# Patient Record
Sex: FEMALE | Race: OTHER | NOT HISPANIC OR LATINO | URBAN - METROPOLITAN AREA
[De-identification: names, ages, dates, MRNs, and addresses within clinical notes are randomized per-mention and may not be internally consistent; named-entity substitution may affect disease eponyms.]

---

## 2022-05-06 ENCOUNTER — EMERGENCY (EMERGENCY)
Facility: HOSPITAL | Age: 42
LOS: 0 days | Discharge: HOME | End: 2022-05-06
Attending: EMERGENCY MEDICINE | Admitting: EMERGENCY MEDICINE
Payer: MEDICAID

## 2022-05-06 VITALS
HEART RATE: 92 BPM | WEIGHT: 192.02 LBS | TEMPERATURE: 98 F | DIASTOLIC BLOOD PRESSURE: 85 MMHG | RESPIRATION RATE: 18 BRPM | SYSTOLIC BLOOD PRESSURE: 134 MMHG | OXYGEN SATURATION: 97 %

## 2022-05-06 DIAGNOSIS — I10 ESSENTIAL (PRIMARY) HYPERTENSION: ICD-10-CM

## 2022-05-06 DIAGNOSIS — E11.9 TYPE 2 DIABETES MELLITUS WITHOUT COMPLICATIONS: ICD-10-CM

## 2022-05-06 DIAGNOSIS — R42 DIZZINESS AND GIDDINESS: ICD-10-CM

## 2022-05-06 DIAGNOSIS — H93.19 TINNITUS, UNSPECIFIED EAR: ICD-10-CM

## 2022-05-06 PROCEDURE — 99284 EMERGENCY DEPT VISIT MOD MDM: CPT

## 2022-05-06 RX ORDER — MECLIZINE HCL 12.5 MG
1 TABLET ORAL
Qty: 30 | Refills: 0
Start: 2022-05-06 | End: 2022-05-15

## 2022-05-06 RX ORDER — MECLIZINE HCL 12.5 MG
50 TABLET ORAL ONCE
Refills: 0 | Status: COMPLETED | OUTPATIENT
Start: 2022-05-06 | End: 2022-05-06

## 2022-05-06 RX ADMIN — Medication 50 MILLIGRAM(S): at 09:44

## 2022-05-06 NOTE — ED ADULT NURSE NOTE - OBJECTIVE STATEMENT
41 year old female complaining of vertigo x2 days. Pt states she feels dizzy and unbalanced. Pt denies pain, nausea, vomiting, fever, chest pain, sob.

## 2022-05-06 NOTE — ED PROVIDER NOTE - PATIENT PORTAL LINK FT
You can access the FollowMyHealth Patient Portal offered by [facility] by registering at the following website: [email or web address]. By joining [facility]’s FollowMyHealth portal, you will also be able to view your health information using other applications (apps) compatible with our system.

## 2022-05-06 NOTE — ED ADULT NURSE NOTE - NSIMPLEMENTINTERV_GEN_ALL_ED
Implemented All Fall Risk Interventions:  [place] to call system. Call bell, personal items and telephone within reach. Instruct patient to call for assistance. Room bathroom lighting operational. Non-slip footwear when patient is off stretcher. Physically safe environment: no spills, clutter or unnecessary equipment. Stretcher in lowest position, wheels locked, appropriate side rails in place. Provide visual cue, wrist band, yellow gown, etc. Monitor gait and stability. Monitor for mental status changes and reorient to person, place, and time. Review medications for side effects contributing to fall risk. Reinforce activity limits and safety measures with patient and family.

## 2022-05-06 NOTE — ED PROVIDER NOTE - NSFOLLOWUPCLINICS_GEN_ALL_ED_FT
[facility]  ENT  [address], [address]nd [place] [address]  Phone: [phone]  Fax:   Follow Up Time: 1-3 Days

## 2022-05-06 NOTE — ED ADULT TRIAGE NOTE - CHIEF COMPLAINT QUOTE
pt c/o unsteady gait and dizziness x2 days. pt went to [facility] yesterday got CT head which was negative. no new symptoms in past 24 hours.  in triage. pt c/o unsteady gait and dizziness x2 days. pt went to [facility] yesterday got CT head which was negative. no new symptoms in past 24 hours. NIH 0.  in triage.

## 2022-05-06 NOTE — ED PROVIDER NOTE - PHYSICAL EXAMINATION
CONST: Well appearing in NAD  EYES: PERRL, EOMI, Sclera and conjunctiva clear.   ENT: No nasal discharge. TM's clear B/L without drainage. No mastoid tenderness Oropharynx normal appearing, no erythema or exudates. Uvula midline.  NECK: Non-tender, no meningeal signs  CARD: Normal S1 S2; Normal rate and rhythm  RESP: Equal BS B/L, No wheezes, rhonchi or rales. No distress  GI: Soft, non-tender, non-distended.  MS: Normal ROM in all extremities. No midline spinal tenderness.  SKIN: Warm, dry, no acute rashes. Good turgor  NEURO: A&Ox3, No focal deficits. Strength 5/5 with no sensory deficits. Steady gait Nystagmus to right VF noted.

## 2022-05-06 NOTE — ED ADULT NURSE NOTE - CHIEF COMPLAINT QUOTE
pt c/o unsteady gait and dizziness x2 days. pt went to [facility] yesterday got CT head which was negative. no new symptoms in past 24 hours. NIH 0.  in triage.

## 2022-05-06 NOTE — ED PROVIDER NOTE - NS ED ATTENDING STATEMENT MOD
This was a shared visit with the LUKASZ. I reviewed and verified the documentation and independently performed the documented:

## 2022-05-06 NOTE — ED PROVIDER NOTE - NS ED ROS FT
CONST: No fever, chills or bodyaches  EYES: No pain, redness, drainage or visual changes.  ENT: (+) ear pain  (+)tinnitus  CARD: No chest pain, palpitations  RESP: No SOB, cough, hemoptysis. No hx of asthma or COPD  GI: No abdominal pain, N/V/D  MS: No joint pain, back pain or extremity pain/injury  SKIN: No rashes  NEURO: No headache, (+)dizziness, no paresthesias or LOC

## 2022-05-06 NOTE — ED PROVIDER NOTE - CLINICAL SUMMARY MEDICAL DECISION MAKING FREE TEXT BOX
42yo F history of HTN DM vertigo presenting with dizziness/room spinning sensaiton x2d. Went to [facility] yesterday, labs CTH done, neg. Presenting as she is not having relief with her sx. States she did not take any medicaations for her sx. Also c/o ear pressure/ringing. No headache, vision changes. Well appearing, NAD, non toxic. NCAT PERRLA EOMI neck supple non tender normal wob cta bl rrr abdomen s nt nd no rebound no guarding WWPx4 neuro non focal b/l TMs clear. pt feeling improved sp meclizine. ambulatory, no ataxia. Comfortable with discharge and follow-up outpatient, strict return precautions given. Endorses understanding of all of this and aware that they can return at any time for new or concerning symptoms. No further questions or concerns at this time

## 2022-05-06 NOTE — ED PROVIDER NOTE - OBJECTIVE STATEMENT
Pt with hx of DM, HTN presents with 2 days of vertigo, room spinning and off balanced gait. Worse with head movement. Was at [facility] yesterday for same and had labs and head CT that as per pt were all normal. Was dc home without meds and was not given an explanation for cause of her symptoms. Pt admits to seasonal allergies this month and for the past week has bilateral ear discomfort and ringing in the right ear. Denies HA, NV, weakness, numbness.

## 2022-05-07 ENCOUNTER — TRANSCRIPTION ENCOUNTER (OUTPATIENT)
Age: 42
End: 2022-05-07

## 2022-07-16 ENCOUNTER — APPOINTMENT (OUTPATIENT)
Age: 42
End: 2022-07-16

## 2022-07-16 PROBLEM — Z00.00 ENCOUNTER FOR PREVENTIVE HEALTH EXAMINATION: Status: ACTIVE | Noted: 2022-07-16

## 2022-12-14 ENCOUNTER — INPATIENT (INPATIENT)
Facility: HOSPITAL | Age: 42
LOS: 2 days | Discharge: HOME | End: 2022-12-17
Attending: HOSPITALIST | Admitting: HOSPITALIST
Payer: MEDICAID

## 2022-12-14 VITALS
TEMPERATURE: 98 F | DIASTOLIC BLOOD PRESSURE: 85 MMHG | SYSTOLIC BLOOD PRESSURE: 155 MMHG | RESPIRATION RATE: 22 BRPM | HEART RATE: 117 BPM | OXYGEN SATURATION: 99 %

## 2022-12-14 LAB
ALBUMIN SERPL ELPH-MCNC: 4.4 G/DL — SIGNIFICANT CHANGE UP (ref 3.5–5.2)
ALP SERPL-CCNC: 118 U/L — HIGH (ref 30–115)
ALT FLD-CCNC: 23 U/L — SIGNIFICANT CHANGE UP (ref 0–41)
ANION GAP SERPL CALC-SCNC: 14 MMOL/L — SIGNIFICANT CHANGE UP (ref 7–14)
APTT BLD: 36.9 SEC — SIGNIFICANT CHANGE UP (ref 27–39.2)
AST SERPL-CCNC: 17 U/L — SIGNIFICANT CHANGE UP (ref 0–41)
BASE EXCESS BLDV CALC-SCNC: -1.3 MMOL/L — SIGNIFICANT CHANGE UP (ref -2–3)
BASOPHILS # BLD AUTO: 0.14 K/UL — SIGNIFICANT CHANGE UP (ref 0–0.2)
BASOPHILS NFR BLD AUTO: 1 % — SIGNIFICANT CHANGE UP (ref 0–1)
BILIRUB SERPL-MCNC: 0.5 MG/DL — SIGNIFICANT CHANGE UP (ref 0.2–1.2)
BUN SERPL-MCNC: 5 MG/DL — LOW (ref 10–20)
CA-I SERPL-SCNC: 1.09 MMOL/L — LOW (ref 1.15–1.33)
CALCIUM SERPL-MCNC: 9.1 MG/DL — SIGNIFICANT CHANGE UP (ref 8.4–10.5)
CHLORIDE SERPL-SCNC: 102 MMOL/L — SIGNIFICANT CHANGE UP (ref 98–110)
CO2 SERPL-SCNC: 23 MMOL/L — SIGNIFICANT CHANGE UP (ref 17–32)
CREAT SERPL-MCNC: 0.7 MG/DL — SIGNIFICANT CHANGE UP (ref 0.7–1.5)
D DIMER BLD IA.RAPID-MCNC: 514 NG/ML DDU — HIGH
EGFR: 110 ML/MIN/1.73M2 — SIGNIFICANT CHANGE UP
EOSINOPHIL # BLD AUTO: 1.81 K/UL — HIGH (ref 0–0.7)
EOSINOPHIL NFR BLD AUTO: 12.8 % — HIGH (ref 0–8)
FLUAV AG NPH QL: SIGNIFICANT CHANGE UP
FLUBV AG NPH QL: SIGNIFICANT CHANGE UP
GAS PNL BLDV: 134 MMOL/L — LOW (ref 136–145)
GAS PNL BLDV: SIGNIFICANT CHANGE UP
GAS PNL BLDV: SIGNIFICANT CHANGE UP
GLUCOSE SERPL-MCNC: 97 MG/DL — SIGNIFICANT CHANGE UP (ref 70–99)
HCG SERPL QL: NEGATIVE — SIGNIFICANT CHANGE UP
HCO3 BLDV-SCNC: 24 MMOL/L — SIGNIFICANT CHANGE UP (ref 22–29)
HCT VFR BLD CALC: 42.4 % — SIGNIFICANT CHANGE UP (ref 37–47)
HCT VFR BLDA CALC: 42 % — SIGNIFICANT CHANGE UP (ref 39–51)
HGB BLD CALC-MCNC: 13.9 G/DL — SIGNIFICANT CHANGE UP (ref 12.6–17.4)
HGB BLD-MCNC: 13.4 G/DL — SIGNIFICANT CHANGE UP (ref 12–16)
IMM GRANULOCYTES NFR BLD AUTO: 0.4 % — HIGH (ref 0.1–0.3)
INR BLD: 0.96 RATIO — SIGNIFICANT CHANGE UP (ref 0.65–1.3)
LACTATE BLDV-MCNC: 2.2 MMOL/L — HIGH (ref 0.5–2)
LYMPHOCYTES # BLD AUTO: 19.9 % — LOW (ref 20.5–51.1)
LYMPHOCYTES # BLD AUTO: 2.82 K/UL — SIGNIFICANT CHANGE UP (ref 1.2–3.4)
MAGNESIUM SERPL-MCNC: 2.8 MG/DL — HIGH (ref 1.8–2.4)
MCHC RBC-ENTMCNC: 22.8 PG — LOW (ref 27–31)
MCHC RBC-ENTMCNC: 31.6 G/DL — LOW (ref 32–37)
MCV RBC AUTO: 72 FL — LOW (ref 81–99)
MONOCYTES # BLD AUTO: 0.43 K/UL — SIGNIFICANT CHANGE UP (ref 0.1–0.6)
MONOCYTES NFR BLD AUTO: 3 % — SIGNIFICANT CHANGE UP (ref 1.7–9.3)
NEUTROPHILS # BLD AUTO: 8.9 K/UL — HIGH (ref 1.4–6.5)
NEUTROPHILS NFR BLD AUTO: 62.9 % — SIGNIFICANT CHANGE UP (ref 42.2–75.2)
NRBC # BLD: 0 /100 WBCS — SIGNIFICANT CHANGE UP (ref 0–0)
NT-PROBNP SERPL-SCNC: 13 PG/ML — SIGNIFICANT CHANGE UP (ref 0–300)
PCO2 BLDV: 43 MMHG — HIGH (ref 39–42)
PH BLDV: 7.36 — SIGNIFICANT CHANGE UP (ref 7.32–7.43)
PLATELET # BLD AUTO: 380 K/UL — SIGNIFICANT CHANGE UP (ref 130–400)
PO2 BLDV: 27 MMHG — SIGNIFICANT CHANGE UP
POTASSIUM BLDV-SCNC: 6.1 MMOL/L — HIGH (ref 3.5–5.1)
POTASSIUM SERPL-MCNC: 3.5 MMOL/L — SIGNIFICANT CHANGE UP (ref 3.5–5)
POTASSIUM SERPL-SCNC: 3.5 MMOL/L — SIGNIFICANT CHANGE UP (ref 3.5–5)
PROT SERPL-MCNC: 7.4 G/DL — SIGNIFICANT CHANGE UP (ref 6–8)
PROTHROM AB SERPL-ACNC: 10.9 SEC — SIGNIFICANT CHANGE UP (ref 9.95–12.87)
RBC # BLD: 5.89 M/UL — HIGH (ref 4.2–5.4)
RBC # FLD: 16.5 % — HIGH (ref 11.5–14.5)
RSV RNA NPH QL NAA+NON-PROBE: SIGNIFICANT CHANGE UP
SAO2 % BLDV: 44 % — SIGNIFICANT CHANGE UP
SARS-COV-2 RNA SPEC QL NAA+PROBE: SIGNIFICANT CHANGE UP
SODIUM SERPL-SCNC: 139 MMOL/L — SIGNIFICANT CHANGE UP (ref 135–146)
TROPONIN T SERPL-MCNC: <0.01 NG/ML — SIGNIFICANT CHANGE UP
WBC # BLD: 14.16 K/UL — HIGH (ref 4.8–10.8)
WBC # FLD AUTO: 14.16 K/UL — HIGH (ref 4.8–10.8)

## 2022-12-14 PROCEDURE — 99223 1ST HOSP IP/OBS HIGH 75: CPT

## 2022-12-14 PROCEDURE — 99285 EMERGENCY DEPT VISIT HI MDM: CPT

## 2022-12-14 PROCEDURE — 71275 CT ANGIOGRAPHY CHEST: CPT | Mod: 26,MA

## 2022-12-14 PROCEDURE — 71045 X-RAY EXAM CHEST 1 VIEW: CPT | Mod: 26

## 2022-12-14 PROCEDURE — 93010 ELECTROCARDIOGRAM REPORT: CPT

## 2022-12-14 RX ORDER — ATORVASTATIN CALCIUM 80 MG/1
1 TABLET, FILM COATED ORAL
Qty: 0 | Refills: 0 | DISCHARGE

## 2022-12-14 RX ORDER — METFORMIN HYDROCHLORIDE 850 MG/1
1 TABLET ORAL
Qty: 0 | Refills: 0 | DISCHARGE

## 2022-12-14 RX ORDER — ATORVASTATIN CALCIUM 80 MG/1
20 TABLET, FILM COATED ORAL AT BEDTIME
Refills: 0 | Status: DISCONTINUED | OUTPATIENT
Start: 2022-12-14 | End: 2022-12-17

## 2022-12-14 RX ORDER — ASPIRIN/CALCIUM CARB/MAGNESIUM 324 MG
81 TABLET ORAL DAILY
Refills: 0 | Status: DISCONTINUED | OUTPATIENT
Start: 2022-12-14 | End: 2022-12-17

## 2022-12-14 RX ORDER — AMLODIPINE BESYLATE 2.5 MG/1
2.5 TABLET ORAL DAILY
Refills: 0 | Status: DISCONTINUED | OUTPATIENT
Start: 2022-12-14 | End: 2022-12-17

## 2022-12-14 RX ORDER — LEVOTHYROXINE SODIUM 125 MCG
88 TABLET ORAL DAILY
Refills: 0 | Status: DISCONTINUED | OUTPATIENT
Start: 2022-12-14 | End: 2022-12-17

## 2022-12-14 RX ORDER — CLONAZEPAM 1 MG
0.5 TABLET ORAL DAILY
Refills: 0 | Status: DISCONTINUED | OUTPATIENT
Start: 2022-12-14 | End: 2022-12-14

## 2022-12-14 RX ORDER — PANTOPRAZOLE SODIUM 20 MG/1
40 TABLET, DELAYED RELEASE ORAL
Refills: 0 | Status: DISCONTINUED | OUTPATIENT
Start: 2022-12-14 | End: 2022-12-17

## 2022-12-14 RX ORDER — IPRATROPIUM/ALBUTEROL SULFATE 18-103MCG
3 AEROSOL WITH ADAPTER (GRAM) INHALATION ONCE
Refills: 0 | Status: COMPLETED | OUTPATIENT
Start: 2022-12-14 | End: 2022-12-14

## 2022-12-14 RX ORDER — IPRATROPIUM/ALBUTEROL SULFATE 18-103MCG
3 AEROSOL WITH ADAPTER (GRAM) INHALATION EVERY 4 HOURS
Refills: 0 | Status: DISCONTINUED | OUTPATIENT
Start: 2022-12-14 | End: 2022-12-17

## 2022-12-14 RX ORDER — IPRATROPIUM/ALBUTEROL SULFATE 18-103MCG
3 AEROSOL WITH ADAPTER (GRAM) INHALATION EVERY 6 HOURS
Refills: 0 | Status: DISCONTINUED | OUTPATIENT
Start: 2022-12-14 | End: 2022-12-17

## 2022-12-14 RX ORDER — ENOXAPARIN SODIUM 100 MG/ML
40 INJECTION SUBCUTANEOUS EVERY 24 HOURS
Refills: 0 | Status: DISCONTINUED | OUTPATIENT
Start: 2022-12-14 | End: 2022-12-17

## 2022-12-14 RX ADMIN — Medication 3 MILLILITER(S): at 20:05

## 2022-12-14 NOTE — ED PROVIDER NOTE - PHYSICAL EXAMINATION
Const: NAD  Eyes: PERRL, no conjunctival injection  HENT:  Neck supple without meningismus   CV: RRR, Warm, well-perfused extremities  RESP: CTA B/L, mild tachypnea   GI: soft, non-tender, non-distended  MSK: No gross deformities appreciated  Skin: Warm, dry. No rashes  Neuro: Alert, CNs II-XII grossly intact. Sensation and motor function of extremities grossly intact.  Psych: Appropriate mood and affect.

## 2022-12-14 NOTE — ED PROVIDER NOTE - CLINICAL SUMMARY MEDICAL DECISION MAKING FREE TEXT BOX
ccruz - pt signed out to me by Dr Raya. sent by pmd for wheezing/sob, 85% on RA in office, was given solumedrol, Mag and 2 duonebs at pmd, CTA neg for PE. pt still feels tight and sob, will give another neb and admit to medicine. still slightly tachycardiac 110s, sat 94-95% RA

## 2022-12-14 NOTE — H&P ADULT - HISTORY OF PRESENT ILLNESS
42 year old patient, known to have HTN and HLD, presented to ED with SOB of 5 days duration.   History goes back to Saturday when patient started having SOB upon exertion and non-productive cough. She went to her PCP and was prescribed Albuterol (used 3 inhalers so far). Her symptoms did not improve so she went to her PCP again, her saturation was 85% on RA so she was placed on O2, given 125 mcg Solumedrol, Magnesium 2 g and Duonebs and was sent to the ED.   She denies fever, chills, chest pain, URT, abdominal or urinary symptoms. She never had a similar episode before. She was never diagnosed with Asthma.   No seasonal allergies, no eczema, no food allergies. No family history of asthma.     In ED, vitals significant for  RR 22 and SpO2 99% on mask.  Laboratory workup significant for WBC 14K   ABG's pH 7.36 pCO2 43 pO2 27 HCO3 24 Lactate 2.2  RVP and COVID negative   CTA chest: No evidence of acute pulmonary embolus or acute thoracic pathology.  Chest X-ray: No radiographic evidence of acute cardiopulmonary disease.  Admitted for clinical picture of asthma exacerbation.  On examination, patient on RA with 95% saturation; still tachycardic.  42 year old patient, known to have HTN, HLD, pre DM, hypothyroidism and history of panic attacks, presented to ED with SOB of 5 days duration.   History goes back to Saturday when patient started having SOB, chest tightness and non-productive cough. She went to her PCP and was prescribed Albuterol (used 3 inhalers so far). Her symptoms did not improve so she went to her PCP again, her saturation was 85% on RA so she was placed on O2, given 125 mcg Solumedrol, Magnesium 2 g and Duonebs and was sent to the ED.   She denies fever, chills, chest pain, URT, abdominal or urinary symptoms. She never had a similar episode before. She was never diagnosed with Asthma.   No seasonal allergies, no eczema, no food allergies. No family history of asthma.     Since May 2022, patient had 2 episodes of panic attacks for which she presented to ED for 24 hours monitoring. During both episodes, she had palpitations, dizziness and SOB.     In ED, vitals significant for  RR 22 and SpO2 99% on mask.  Laboratory workup significant for WBC 14K   ABG's pH 7.36 pCO2 43 pO2 27 HCO3 24 Lactate 2.2  RVP and COVID negative   CTA chest: No evidence of acute pulmonary embolus or acute thoracic pathology.  Chest X-ray: No radiographic evidence of acute cardiopulmonary disease.  Admitted for clinical picture of asthma exacerbation.  On examination, patient on RA with 95% saturation; still tachycardic.

## 2022-12-14 NOTE — ED ADULT NURSE NOTE - NSIMPLEMENTINTERV_GEN_ALL_ED
Implemented All Universal Safety Interventions:  [place] to call system. Call bell, personal items and telephone within reach. Instruct patient to call for assistance. Room bathroom lighting operational. Non-slip footwear when patient is off stretcher. Physically safe environment: no spills, clutter or unnecessary equipment. Stretcher in lowest position, wheels locked, appropriate side rails in place.

## 2022-12-14 NOTE — H&P ADULT - ASSESSMENT
42 year old patient, known to have HTN and HLD, presented to ED with SOB of 5 days duration. She was admitted for clinical picture consistent with asthma exacerbation.     #SOB and non- productive cough likely secondary to Asthma Exacerbation? Undiagnosed Asthma  - In ED, vitals significant for  RR 22 and SpO2 99% on mask.  - Laboratory workup significant for WBC 14K   - ABG's pH 7.36 pCO2 43 pO2 27 HCO3 24 Lactate 2.2  - RVP and COVID negative   - CTA chest: No evidence of acute pulmonary embolus or acute thoracic pathology.  - Chest X-ray: No radiographic evidence of acute cardiopulmonary disease.  - s/p 125 mcg Solumedrol, Magnesium 2 g and Duonebs at her PCP's office   - c/w Solumedrol 40mg q12 and Duonebs q6 PRN in the setting of tachycardia   - Pulmonology consulted  - Monitor O2 requirements and provide O2 as needed    #Leukocytosis likely inflammatory   - WBC 14K   - No signs of infection ; off Abx for now     #HTN  -     #HLD  -    Activity: IAT  Diet: DASH/TLC  DVT ppx: Lovenox  GI ppx: none 42 year old patient, known to have HTN, HLD, pre DM, hypothyroidism and history of panic attacks, presented to ED with SOB of 5 days duration. She was admitted for clinical picture consistent with asthma exacerbation/reactive airways.     #SOB and non- productive cough likely secondary to Asthma Exacerbation? (Undiagnosed Asthma)  Less likely panic attack in the setting of the long duration of symptoms (5 days). In addition, patient felt improvement after Duoneb intake.    - In ED, vitals significant for  RR 22 and SpO2 99% on mask.  - Laboratory workup significant for WBC 14K   - ABG's pH 7.36 pCO2 43 pO2 27 HCO3 24 Lactate 2.2  - RVP and COVID negative   - CTA chest: No evidence of acute pulmonary embolus or acute thoracic pathology.  - Chest X-ray: No radiographic evidence of acute cardiopulmonary disease.  - s/p 125 mcg Solumedrol, Magnesium 2 g and Duonebs at her PCP's office   - c/w Solumedrol 40mg q12 and Duonebs q6  - Pulmonology consulted  - Monitor O2 requirements and provide O2 as needed    #Leukocytosis likely inflammatory   - WBC 14K   - No signs of infection ; off Abx for now     #Hypothyroidism  - c/w Synthroid    #Pre- diabetes on Metformin?  - a1c in am  - Monitor FS  - Start ISS or insulin premeals/basal if needed     #HTN  - c/w Amlodipine    #HLD  - c/w Atorvastatin     Activity: IAT  Diet: DASH/TLC  DVT ppx: Lovenox  GI ppx: none

## 2022-12-14 NOTE — H&P ADULT - NSHPLABSRESULTS_GEN_ALL_CORE
13.4   14.16 )-----------( 380      ( 14 Dec 2022 13:43 )             42.4       12-14    139  |  102  |  5<L>  ----------------------------<  97  3.5   |  23  |  0.7    Ca    9.1      14 Dec 2022 13:43  Mg     2.8     12-14    TPro  7.4  /  Alb  4.4  /  TBili  0.5  /  DBili  x   /  AST  17  /  ALT  23  /  AlkPhos  118<H>  12-14      < from: CT Angio Chest PE Protocol w/ IV Cont (12.14.22 @ 15:48) >    1.  No evidence of acute pulmonary embolus or acute thoracic pathology.    < end of copied text >    < from: Xray Chest 1 View- PORTABLE-Urgent (12.14.22 @ 14:07) >    No radiographic evidence of acute cardiopulmonary disease.    < end of copied text >

## 2022-12-14 NOTE — ED PROVIDER NOTE - NS ED ROS FT
Review of Systems   Constitutional:  No Weight Change, No Fever, No Chills, No weakness    ENT/Mouth:  No Nasal Congestion, No Hoarseness, No sore throat, No Rhinorrhea, No Swallowing Difficulty  Eyes:  No Eye Pain, No Swelling, No Redness, No Discharge, No Vision Changes  Cardiovascular:  No Chest Pain, No palpitations, No Dyspnea on Exertion, No Orthopnea  Respiratory:  + SOB, + Cough, No   Gastrointestinal:  No Nausea, No Vomiting, No Diarrhea, No Constipation, No abdominal pain, No melena or bright red blood per rectum  Genitourinary:  No Dysuria, No Urinary Frequency, No Hematuria, No Urinary Incontinence,  Musculoskeletal:  No Arthralgias, No Myalgias, No Joint Swelling, No Joint Stiffness,  Skin:  No rashes

## 2022-12-14 NOTE — ED ADULT NURSE NOTE - IN THE PAST 12 MONTHS HAVE YOU USED DRUGS OTHER THAN THOSE REQUIRED FOR MEDICAL REASON?
Reason for call:  Patient reporting a symptom    Symptom or request: Ml is asking to talk to Dr Benedict's nurse. She says her blood sugars have been running high. She says her morning blood sugars fasting have been in the 130's and usually they were in the 90's. She says she isn't feeling well and hasn't eaten for 4 hours and her blood sugar was 196.     Phone Number patient can be reached at:  Home number on file [phone] (home)  Or Cell [phone]    Best Time:  anytime    Can we leave a detailed message on this number:  YES    Call taken on 10/17/2018 at 3:24 PM by Charis Le     No

## 2022-12-14 NOTE — ED ADULT NURSE NOTE - OBJECTIVE STATEMENT
Pt c/o shortness of breath.  Patient states she has been having a cough since last Saturday she was given inhaler by her primary care doctor which she has been using.  She states she does not have any history of asthma exacerbation.  No fever no chills no nausea no vomiting.  pt denies any chest pain or discomfort. No signs of distress noted.

## 2022-12-14 NOTE — H&P ADULT - ATTENDING COMMENTS
Patient seen and examined at bedside independently of the residents. I read the resident's note and agree with the plan with the additions and corrections as noted below.    REVIEW OF SYSTEMS:  CONSTITUTIONAL: No weakness, fevers or chills  EYES/ENT: No visual changes;  No vertigo or throat pain   NECK: No pain or stiffness  RESPIRATORY: No cough, wheezing, hemoptysis. SOB.   CARDIOVASCULAR: No chest pain or palpitations  GASTROINTESTINAL: No abdominal or epigastric pain. No nausea, vomiting, or hematemesis; No diarrhea or constipation. No melena or hematochezia.  GENITOURINARY: No dysuria, frequency or hematuria  NEUROLOGICAL: No numbness or weakness  MSK: No pain. No weakness.   SKIN: No itching, rashes.     PMH: HTN, HLD, Pre-DM, Hypothyroidism    FHx: Reviewed. No fhx of asthma/copd, No fhx of liver and pulmonary disease. No fhx of hematological disorder.     Physical Exam:  GEN: No acute distress. Awake, Alert and oriented x 3.   Head: Atraumatic, Normocephalic.   Eye: PEERLA. No sclera icterus. EOMI.   ENT: Normal oropharynx, no thyromegaly, no mass, no lymphadenopathy.   LUNGS: Clear to auscultation bilaterally. No wheeze/rales/crackles.   HEART: Normal. S1/S2 present. RRR. No murmur/gallops.   ABD: Soft, non-tender, non-distended. Bowel sounds present.   EXT: No pitting edema. No erythema. No tenderness.  Integumentary: No rash, No sore, No petechia.   NEURO: CN III-XII intact. Strength: 5/5 b/l ULE. Sensory intact b/l ULE.     Vital Signs Last 24 Hrs  T(C): 36.8 (14 Dec 2022 20:39), Max: 36.8 (14 Dec 2022 20:39)  T(F): 98.3 (14 Dec 2022 20:39), Max: 98.3 (14 Dec 2022 20:39)  HR: 97 (14 Dec 2022 20:39) (97 - 117)  BP: 140/86 (14 Dec 2022 20:39) (134/73 - 155/85)  BP(mean): --  RR: 18 (14 Dec 2022 20:39) (18 - 22)  SpO2: 98% (14 Dec 2022 20:39) (95% - 99%)    Parameters below as of 14 Dec 2022 20:39  Patient On (Oxygen Delivery Method): room air      Please see the above notes for Labs and radiology.     Assessment and Plan:     43 yo F with hx of HTN, HLD, Pre-DM, Hypothyroidism presents to ED for 5 days history of SOB.     AHRF likely 2/2 suspected Asthma exacerbation   - CTA chest negative for acute PE. No consolidation.   - c/w IV solumedrol 40mg BID   - c/w albuterol q6h and q4h prn  - daily peak flow  - May need to follow up with pulmonary outpatient.     HTN - on amlodipine  HLD - statin   Pre-DM II - check HbA1c. May start on insulin if FS persistently > 180.   Hypothyroidism - c/w synthroid. check TSH.     DVT ppx: Lovenox SC  GI ppx: PPI while on steroid   Diet: DASH  Activity: as tolerated.     Date seen by the attendin2022. Patient seen and examined at bedside independently of the residents. I read the resident's note and agree with the plan with the additions and corrections as noted below.    REVIEW OF SYSTEMS:  CONSTITUTIONAL: No weakness, fevers or chills  EYES/ENT: No visual changes;  No vertigo or throat pain   NECK: No pain or stiffness  RESPIRATORY: No cough, wheezing, hemoptysis. SOB.   CARDIOVASCULAR: No chest pain or palpitations  GASTROINTESTINAL: No abdominal or epigastric pain. No nausea, vomiting, or hematemesis; No diarrhea or constipation. No melena or hematochezia.  GENITOURINARY: No dysuria, frequency or hematuria  NEUROLOGICAL: No numbness or weakness  MSK: No pain. No weakness.   SKIN: No itching, rashes.     PMH: HTN, HLD, Pre-DM, Hypothyroidism    FHx: Reviewed. No fhx of asthma/copd, No fhx of liver and pulmonary disease. No fhx of hematological disorder.     Physical Exam:  GEN: No acute distress. Awake, Alert and oriented x 3.   Head: Atraumatic, Normocephalic.   Eye: PEERLA. No sclera icterus. EOMI.   ENT: Normal oropharynx, no thyromegaly, no mass, no lymphadenopathy.   LUNGS: Very Mild wheezing b/l lung fields.   HEART: Normal. S1/S2 present. RRR. No murmur/gallops.   ABD: Soft, non-tender, non-distended. Bowel sounds present.   EXT: No pitting edema. No erythema. No tenderness.  Integumentary: No rash, No sore, No petechia.   NEURO: CN III-XII intact. Strength: 5/5 b/l ULE. Sensory intact b/l ULE.     Vital Signs Last 24 Hrs  T(C): 36.8 (14 Dec 2022 20:39), Max: 36.8 (14 Dec 2022 20:39)  T(F): 98.3 (14 Dec 2022 20:39), Max: 98.3 (14 Dec 2022 20:39)  HR: 97 (14 Dec 2022 20:39) (97 - 117)  BP: 140/86 (14 Dec 2022 20:39) (134/73 - 155/85)  BP(mean): --  RR: 18 (14 Dec 2022 20:39) (18 - 22)  SpO2: 98% (14 Dec 2022 20:39) (95% - 99%)    Parameters below as of 14 Dec 2022 20:39  Patient On (Oxygen Delivery Method): room air      Please see the above notes for Labs and radiology.     Assessment and Plan:     41 yo F with hx of HTN, HLD, Pre-DM, Hypothyroidism presents to ED for 5 days history of SOB.     AHRF likely 2/2 suspected Asthma exacerbation   - CTA chest negative for acute PE. No consolidation.   - c/w IV solumedrol 40mg qd   - c/w albuterol q6h and q4h prn  - daily peak flow  - May need to follow up with pulmonary outpatient.     HTN - on amlodipine  HLD - statin   Pre-DM II - check HbA1c. May start on insulin if FS persistently > 180.   Hypothyroidism - c/w synthroid. check TSH.     DVT ppx: Lovenox SC  GI ppx: PPI while on steroid   Diet: DASH  Activity: as tolerated.     Date seen by the attendin2022.

## 2022-12-14 NOTE — H&P ADULT - NSHPPHYSICALEXAM_GEN_ALL_CORE
T(C): 36.4 (12-14-22 @ 16:54), Max: 36.4 (12-14-22 @ 13:02)  HR: 110 (12-14-22 @ 16:54) (110 - 117)  BP: 134/73 (12-14-22 @ 16:54) (134/73 - 155/85)  RR: 20 (12-14-22 @ 16:54) (20 - 22)  SpO2: 95% (12-14-22 @ 16:54) (95% - 99%)    CONSTITUTIONAL: Well groomed, no apparent distress  RESP: No respiratory distress, no use of accessory muscles;  CV: RRR, +S1S2; no peripheral edema  GI: Soft, NT, ND, no rebound, no guarding; no palpable masses  NEURO: AAOx3; no focal neurologic deficits

## 2022-12-14 NOTE — ED PROVIDER NOTE - OBJECTIVE STATEMENT
42-year-old female with past medical history of hypothyroidism hypertension presents to ED for shortness of breath.  Patient states she has been having a cough since last Saturday she was given inhaler by her primary care doctor which she has been using.  She says over the past 3 days she is gone through 3 inhalers.  She does not have any history of asthma exacerbation.  No fever no chills no nausea no vomiting.  Patient states her legs are intermittently swollen.  No recent surgery no travel no cigarette use no history of DVT or PE.  She says she is been more short of breath when she walks.  No chest pain.  Patient under primary care doctor and received duo nebs x2 as well as 2 mg of magnesium IV and 125 mg of dexamethasone IM and was brought to the emergency department. 42-year-old female with past medical history of hypertension presents to ED for shortness of breath.  Patient states she has been having a cough since last Saturday she was given inhaler by her primary care doctor which she has been using.  She says over the past 3 days she is gone through 3 inhalers.  She does not have any history of asthma exacerbation.  No fever no chills no nausea no vomiting.  Patient states her legs are intermittently swollen.  No recent surgery no travel no cigarette use no history of DVT or PE.  She says she is been more short of breath when she walks.  No chest pain.  Patient under primary care doctor and received duo nebs x2 as well as 2 mg of magnesium IV and 125 mg of dexamethasone IM and was brought to the emergency department.

## 2022-12-15 LAB
A1C WITH ESTIMATED AVERAGE GLUCOSE RESULT: 5.8 % — HIGH (ref 4–5.6)
ALBUMIN SERPL ELPH-MCNC: 4.1 G/DL — SIGNIFICANT CHANGE UP (ref 3.5–5.2)
ALP SERPL-CCNC: 108 U/L — SIGNIFICANT CHANGE UP (ref 30–115)
ALT FLD-CCNC: 20 U/L — SIGNIFICANT CHANGE UP (ref 0–41)
ANION GAP SERPL CALC-SCNC: 11 MMOL/L — SIGNIFICANT CHANGE UP (ref 7–14)
AST SERPL-CCNC: 12 U/L — SIGNIFICANT CHANGE UP (ref 0–41)
BASOPHILS # BLD AUTO: 0.03 K/UL — SIGNIFICANT CHANGE UP (ref 0–0.2)
BASOPHILS NFR BLD AUTO: 0.2 % — SIGNIFICANT CHANGE UP (ref 0–1)
BILIRUB SERPL-MCNC: 0.4 MG/DL — SIGNIFICANT CHANGE UP (ref 0.2–1.2)
BUN SERPL-MCNC: 11 MG/DL — SIGNIFICANT CHANGE UP (ref 10–20)
CALCIUM SERPL-MCNC: 8.9 MG/DL — SIGNIFICANT CHANGE UP (ref 8.4–10.5)
CHLORIDE SERPL-SCNC: 106 MMOL/L — SIGNIFICANT CHANGE UP (ref 98–110)
CHOLEST SERPL-MCNC: 142 MG/DL — SIGNIFICANT CHANGE UP
CO2 SERPL-SCNC: 24 MMOL/L — SIGNIFICANT CHANGE UP (ref 17–32)
CREAT SERPL-MCNC: 0.7 MG/DL — SIGNIFICANT CHANGE UP (ref 0.7–1.5)
EGFR: 110 ML/MIN/1.73M2 — SIGNIFICANT CHANGE UP
EOSINOPHIL # BLD AUTO: 0.01 K/UL — SIGNIFICANT CHANGE UP (ref 0–0.7)
EOSINOPHIL NFR BLD AUTO: 0.1 % — SIGNIFICANT CHANGE UP (ref 0–8)
ESTIMATED AVERAGE GLUCOSE: 120 MG/DL — HIGH (ref 68–114)
GLUCOSE BLDC GLUCOMTR-MCNC: 110 MG/DL — HIGH (ref 70–99)
GLUCOSE BLDC GLUCOMTR-MCNC: 195 MG/DL — HIGH (ref 70–99)
GLUCOSE BLDC GLUCOMTR-MCNC: 212 MG/DL — HIGH (ref 70–99)
GLUCOSE BLDC GLUCOMTR-MCNC: 95 MG/DL — SIGNIFICANT CHANGE UP (ref 70–99)
GLUCOSE SERPL-MCNC: 135 MG/DL — HIGH (ref 70–99)
HCT VFR BLD CALC: 39.1 % — SIGNIFICANT CHANGE UP (ref 37–47)
HDLC SERPL-MCNC: 50 MG/DL — LOW
HGB BLD-MCNC: 12.8 G/DL — SIGNIFICANT CHANGE UP (ref 12–16)
IMM GRANULOCYTES NFR BLD AUTO: 0.6 % — HIGH (ref 0.1–0.3)
LIPID PNL WITH DIRECT LDL SERPL: 82 MG/DL — SIGNIFICANT CHANGE UP
LYMPHOCYTES # BLD AUTO: 1.94 K/UL — SIGNIFICANT CHANGE UP (ref 1.2–3.4)
LYMPHOCYTES # BLD AUTO: 14 % — LOW (ref 20.5–51.1)
MAGNESIUM SERPL-MCNC: 2.3 MG/DL — SIGNIFICANT CHANGE UP (ref 1.8–2.4)
MCHC RBC-ENTMCNC: 23.2 PG — LOW (ref 27–31)
MCHC RBC-ENTMCNC: 32.7 G/DL — SIGNIFICANT CHANGE UP (ref 32–37)
MCV RBC AUTO: 70.8 FL — LOW (ref 81–99)
MONOCYTES # BLD AUTO: 0.88 K/UL — HIGH (ref 0.1–0.6)
MONOCYTES NFR BLD AUTO: 6.3 % — SIGNIFICANT CHANGE UP (ref 1.7–9.3)
NEUTROPHILS # BLD AUTO: 10.94 K/UL — HIGH (ref 1.4–6.5)
NEUTROPHILS NFR BLD AUTO: 78.8 % — HIGH (ref 42.2–75.2)
NON HDL CHOLESTEROL: 92 MG/DL — SIGNIFICANT CHANGE UP
NRBC # BLD: 0 /100 WBCS — SIGNIFICANT CHANGE UP (ref 0–0)
PHOSPHATE SERPL-MCNC: 3.5 MG/DL — SIGNIFICANT CHANGE UP (ref 2.1–4.9)
PLATELET # BLD AUTO: 376 K/UL — SIGNIFICANT CHANGE UP (ref 130–400)
POTASSIUM SERPL-MCNC: 4 MMOL/L — SIGNIFICANT CHANGE UP (ref 3.5–5)
POTASSIUM SERPL-SCNC: 4 MMOL/L — SIGNIFICANT CHANGE UP (ref 3.5–5)
PROT SERPL-MCNC: 6.9 G/DL — SIGNIFICANT CHANGE UP (ref 6–8)
RBC # BLD: 5.52 M/UL — HIGH (ref 4.2–5.4)
RBC # FLD: 16.5 % — HIGH (ref 11.5–14.5)
SODIUM SERPL-SCNC: 141 MMOL/L — SIGNIFICANT CHANGE UP (ref 135–146)
TRIGL SERPL-MCNC: 50 MG/DL — SIGNIFICANT CHANGE UP
WBC # BLD: 13.88 K/UL — HIGH (ref 4.8–10.8)
WBC # FLD AUTO: 13.88 K/UL — HIGH (ref 4.8–10.8)

## 2022-12-15 PROCEDURE — 99233 SBSQ HOSP IP/OBS HIGH 50: CPT

## 2022-12-15 PROCEDURE — 99221 1ST HOSP IP/OBS SF/LOW 40: CPT

## 2022-12-15 PROCEDURE — 99252 IP/OBS CONSLTJ NEW/EST SF 35: CPT

## 2022-12-15 RX ORDER — AZITHROMYCIN 500 MG/1
500 TABLET, FILM COATED ORAL DAILY
Refills: 0 | Status: DISCONTINUED | OUTPATIENT
Start: 2022-12-15 | End: 2022-12-17

## 2022-12-15 RX ORDER — IBUPROFEN 200 MG
200 TABLET ORAL EVERY 6 HOURS
Refills: 0 | Status: DISCONTINUED | OUTPATIENT
Start: 2022-12-15 | End: 2022-12-17

## 2022-12-15 RX ORDER — BUDESONIDE AND FORMOTEROL FUMARATE DIHYDRATE 160; 4.5 UG/1; UG/1
2 AEROSOL RESPIRATORY (INHALATION)
Refills: 0 | Status: DISCONTINUED | OUTPATIENT
Start: 2022-12-15 | End: 2022-12-17

## 2022-12-15 RX ADMIN — ENOXAPARIN SODIUM 40 MILLIGRAM(S): 100 INJECTION SUBCUTANEOUS at 00:02

## 2022-12-15 RX ADMIN — ATORVASTATIN CALCIUM 20 MILLIGRAM(S): 80 TABLET, FILM COATED ORAL at 21:47

## 2022-12-15 RX ADMIN — Medication 200 MILLIGRAM(S): at 23:35

## 2022-12-15 RX ADMIN — Medication 3 MILLILITER(S): at 08:36

## 2022-12-15 RX ADMIN — Medication 3 MILLILITER(S): at 01:28

## 2022-12-15 RX ADMIN — Medication 200 MILLIGRAM(S): at 12:37

## 2022-12-15 RX ADMIN — Medication 40 MILLIGRAM(S): at 06:42

## 2022-12-15 RX ADMIN — PANTOPRAZOLE SODIUM 40 MILLIGRAM(S): 20 TABLET, DELAYED RELEASE ORAL at 06:42

## 2022-12-15 RX ADMIN — AZITHROMYCIN 500 MILLIGRAM(S): 500 TABLET, FILM COATED ORAL at 13:36

## 2022-12-15 RX ADMIN — Medication 200 MILLIGRAM(S): at 17:37

## 2022-12-15 RX ADMIN — Medication 3 MILLILITER(S): at 15:40

## 2022-12-15 RX ADMIN — Medication 200 MILLIGRAM(S): at 23:05

## 2022-12-15 RX ADMIN — Medication 3 MILLILITER(S): at 20:54

## 2022-12-15 RX ADMIN — Medication 81 MILLIGRAM(S): at 12:36

## 2022-12-15 RX ADMIN — ENOXAPARIN SODIUM 40 MILLIGRAM(S): 100 INJECTION SUBCUTANEOUS at 23:06

## 2022-12-15 RX ADMIN — AMLODIPINE BESYLATE 2.5 MILLIGRAM(S): 2.5 TABLET ORAL at 06:42

## 2022-12-15 RX ADMIN — ATORVASTATIN CALCIUM 20 MILLIGRAM(S): 80 TABLET, FILM COATED ORAL at 00:02

## 2022-12-15 RX ADMIN — Medication 88 MICROGRAM(S): at 06:42

## 2022-12-15 NOTE — PATIENT PROFILE ADULT - FALL HARM RISK - HARM RISK INTERVENTIONS

## 2022-12-15 NOTE — CONSULT NOTE ADULT - ATTENDING COMMENTS
IMPRESSION:    Asthma exacerbation  Dyspnea/ Cough  HO Tachycardia  HO anxiety/panic attacks  HO hypothyroidism    Impression and plana are my own. IMPRESSION:    Asthma exacerbation  Peripheral eosinophilia   Dyspnea/ Cough  HO Tachycardia  HO anxiety/panic attacks  HO hypothyroidism    Impression and plana are my own.

## 2022-12-15 NOTE — CONSULT NOTE ADULT - SUBJECTIVE AND OBJECTIVE BOX
Patient is a 42y old  Female who presents with a chief complaint of Asthma exacerbation (14 Dec 2022 19:59)      HPI:  42 year old patient, known to have HTN, HLD, pre DM, hypothyroidism and history of panic attacks, presented to ED with SOB of 5 days duration.   History goes back to Saturday when patient started having SOB, chest tightness and non-productive cough. She went to her PCP and was prescribed Albuterol (used 3 inhalers so far). Her symptoms did not improve so she went to her PCP again, her saturation was 85% on RA so she was placed on O2, given 125 mcg Solumedrol, Magnesium 2 g and Duonebs and was sent to the ED.   She denies fever, chills, chest pain, URT, abdominal or urinary symptoms. She never had a similar episode before. She was never diagnosed with Asthma.   No seasonal allergies, no eczema, no food allergies. No family history of asthma.     Since May 2022, patient had 2 episodes of panic attacks for which she presented to ED for 24 hours monitoring. During both episodes, she had palpitations, dizziness and SOB.     In ED, vitals significant for  RR 22 and SpO2 99% on mask.  Laboratory workup significant for WBC 14K   ABG's pH 7.36 pCO2 43 pO2 27 HCO3 24 Lactate 2.2  RVP and COVID negative   CTA chest: No evidence of acute pulmonary embolus or acute thoracic pathology.  Chest X-ray: No radiographic evidence of acute cardiopulmonary disease.  Admitted for clinical picture of asthma exacerbation.  On examination, patient on RA with 95% saturation; still tachycardic.  (14 Dec 2022 19:59)      PAST MEDICAL & SURGICAL HISTORY:      SOCIAL HX:   Smoking     never                    ETOH                            Other    FAMILY HISTORY:  :  No known cardiovacular family hisotry     Review Of Systems:     All ROS are negative except per HPI       Allergies    No Known Allergies    Intolerances          PHYSICAL EXAM    ICU Vital Signs Last 24 Hrs  T(C): 36.1 (15 Dec 2022 04:53), Max: 36.8 (14 Dec 2022 20:39)  T(F): 96.9 (15 Dec 2022 04:53), Max: 98.3 (14 Dec 2022 20:39)  HR: 94 (15 Dec 2022 06:00) (94 - 117)  BP: 126/80 (15 Dec 2022 06:00) (112/71 - 155/85)  BP(mean): --  ABP: --  ABP(mean): --  RR: 18 (15 Dec 2022 06:00) (18 - 22)  SpO2: 93% (15 Dec 2022 08:36) (90% - 99%)    O2 Parameters below as of 15 Dec 2022 08:36  Patient On (Oxygen Delivery Method): nasal cannula  O2 Flow (L/min): 2          CONSTITUTIONAL:  Well nourished.   NAD    ENT:   Airway patent,   Mouth with normal mucosa.   No thrush  on room air    CARDIAC:   tachycardic,   Regular rhythm.    No edema    Vascular:   normal systolic impulse  no bruits    RESPIRATORY:   No wheezing  Bilateral BS   Not tachypneic,  No use of accessory muscles    GASTROINTESTINAL:  Abdomen soft,   Non-tender,   No guarding,     NEUROLOGICAL:   Alert and oriented   No motor deficits.    SKIN:   Skin normal color for race,   No evidence of rash.          LABS:                          12.8   13.88 )-----------( 376      ( 15 Dec 2022 06:47 )             39.1                                               12-15    141  |  106  |  11  ----------------------------<  135<H>  4.0   |  24  |  0.7    Ca    8.9      15 Dec 2022 06:47  Phos  3.5     12-15  Mg     2.3     12-15    TPro  6.9  /  Alb  4.1  /  TBili  0.4  /  DBili  x   /  AST  12  /  ALT  20  /  AlkPhos  108  12-15      PT/INR - ( 14 Dec 2022 13:43 )   PT: 10.90 sec;   INR: 0.96 ratio         PTT - ( 14 Dec 2022 13:43 )  PTT:36.9 sec                                           CARDIAC MARKERS ( 14 Dec 2022 13:43 )  x     / <0.01 ng/mL / x     / x     / x                                                LIVER FUNCTIONS - ( 15 Dec 2022 06:47 )  Alb: 4.1 g/dL / Pro: 6.9 g/dL / ALK PHOS: 108 U/L / ALT: 20 U/L / AST: 12 U/L / GGT: x                                                                                                                                       X-Rays reviewed                                                                                     ECHO    CXR interpreted by me     MEDICATIONS  (STANDING):  albuterol/ipratropium for Nebulization 3 milliLiter(s) Nebulizer every 6 hours  amLODIPine   Tablet 2.5 milliGRAM(s) Oral daily  aspirin enteric coated 81 milliGRAM(s) Oral daily  atorvastatin 20 milliGRAM(s) Oral at bedtime  azithromycin   Tablet 500 milliGRAM(s) Oral daily  enoxaparin Injectable 40 milliGRAM(s) SubCutaneous every 24 hours  ibuprofen  Tablet. 200 milliGRAM(s) Oral every 6 hours  levothyroxine 88 MICROGram(s) Oral daily  methylPREDNISolone sodium succinate Injectable 40 milliGRAM(s) IV Push daily  pantoprazole    Tablet 40 milliGRAM(s) Oral before breakfast    MEDICATIONS  (PRN):  albuterol/ipratropium for Nebulization 3 milliLiter(s) Nebulizer every 4 hours PRN Shortness of Breath and/or Wheezing

## 2022-12-15 NOTE — PROGRESS NOTE ADULT - SUBJECTIVE AND OBJECTIVE BOX
LENGTH OF HOSPITAL STAY: 1d    Overnight events: none  Complaints: none    CHIEF COMPLAINT:   Patient is a 42y old  Female who presents with a chief complaint of Asthma exacerbation (15 Dec 2022 11:29)        HISTORY OF PRESENTING ILLNESS:    HPI:  42 year old patient, known to have HTN, HLD, pre DM, hypothyroidism and history of panic attacks, presented to ED with SOB of 5 days duration.   History goes back to Saturday when patient started having SOB, chest tightness and non-productive cough. She went to her PCP and was prescribed Albuterol (used 3 inhalers so far). Her symptoms did not improve so she went to her PCP again, her saturation was 85% on RA so she was placed on O2, given 125 mcg Solumedrol, Magnesium 2 g and Duonebs and was sent to the ED.   She denies fever, chills, chest pain, URT, abdominal or urinary symptoms. She never had a similar episode before. She was never diagnosed with Asthma.   No seasonal allergies, no eczema, no food allergies. No family history of asthma.     Since May 2022, patient had 2 episodes of panic attacks for which she presented to ED for 24 hours monitoring. During both episodes, she had palpitations, dizziness and SOB.     In ED, vitals significant for  RR 22 and SpO2 99% on mask.  Laboratory workup significant for WBC 14K   ABG's pH 7.36 pCO2 43 pO2 27 HCO3 24 Lactate 2.2  RVP and COVID negative   CTA chest: No evidence of acute pulmonary embolus or acute thoracic pathology.  Chest X-ray: No radiographic evidence of acute cardiopulmonary disease.  Admitted for clinical picture of asthma exacerbation.  On examination, patient on RA with 95% saturation; still tachycardic.  (14 Dec 2022 19:59)    PAST MEDICAL & SURGICAL HISTORY  PAST MEDICAL & SURGICAL HISTORY:    SOCIAL HISTORY:    ALLERGIES:  No Known Allergies    MEDICATIONS:  STANDING MEDICATIONS  albuterol/ipratropium for Nebulization 3 milliLiter(s) Nebulizer every 6 hours  amLODIPine   Tablet 2.5 milliGRAM(s) Oral daily  aspirin enteric coated 81 milliGRAM(s) Oral daily  atorvastatin 20 milliGRAM(s) Oral at bedtime  azithromycin   Tablet 500 milliGRAM(s) Oral daily  enoxaparin Injectable 40 milliGRAM(s) SubCutaneous every 24 hours  ibuprofen  Tablet. 200 milliGRAM(s) Oral every 6 hours  levothyroxine 88 MICROGram(s) Oral daily  methylPREDNISolone sodium succinate Injectable 40 milliGRAM(s) IV Push daily  pantoprazole    Tablet 40 milliGRAM(s) Oral before breakfast    PRN MEDICATIONS  albuterol/ipratropium for Nebulization 3 milliLiter(s) Nebulizer every 4 hours PRN    VITALS:   T(F): 98  HR: 78  BP: 163/76  RR: 15  SpO2: 93%    LABS:                        12.8   13.88 )-----------( 376      ( 15 Dec 2022 06:47 )             39.1     12-15    141  |  106  |  11  ----------------------------<  135<H>  4.0   |  24  |  0.7    Ca    8.9      15 Dec 2022 06:47  Phos  3.5     12-15  Mg     2.3     12-15    TPro  6.9  /  Alb  4.1  /  TBili  0.4  /  DBili  x   /  AST  12  /  ALT  20  /  AlkPhos  108  12-15    PT/INR - ( 14 Dec 2022 13:43 )   PT: 10.90 sec;   INR: 0.96 ratio         PTT - ( 14 Dec 2022 13:43 )  PTT:36.9 sec          CARDIAC MARKERS ( 14 Dec 2022 13:43 )  x     / <0.01 ng/mL / x     / x     / x            PHYSICAL EXAM:  GEN: No acute distress  LUNGS: Normal respiratory effort. wheezing; 2L via NC  HEART: S1/S2 present. RRR. no murmurs  ABD: Soft, non-tender, non-distended  EXT: no cyanosis or edema  NEURO: AAOX3, no focal deficits

## 2022-12-15 NOTE — PROGRESS NOTE ADULT - ASSESSMENT
#SOB and non- productive cough likely secondary to Asthma Exacerbation? (Undiagnosed Asthma)  Less likely panic attack in the setting of the long duration of symptoms (5 days). In addition, patient felt improvement after Duoneb intake.    - pt has no hx of asthma, non-smoker but her  smokes  - In ED, vitals significant for  RR 22 and SpO2 99% on mask.  - Laboratory workup significant for WBC 14K   - ABG's pH 7.36 pCO2 43 pO2 27 HCO3 24 Lactate 2.2  - RVP and COVID negative   - CTA chest: No evidence of acute pulmonary embolus or acute thoracic pathology.  - Chest X-ray: No radiographic evidence of acute cardiopulmonary disease.  - s/p 125 mcg Solumedrol, Magnesium 2 g and Duonebs at her PCP's office   - c/w Solumedrol 40mg q12 and Duonebs q6  - Pulmonology consulted  - wean oxygen as tolerated   - Significant peripheral eosinophilia noted; check IGE level when off steroids  - Recommend to check Aspergillus IgG, IgE as well  - Start Symbicort 160 BID; Albuterol as needed  - Recommend Prednisone 40mg daily to be tapered slowly over 10-14 days   - Recommend outpatient allergist evaluation   - Trigger avoidance; if asthma remains uncontrolled then she will qualify for injectable biologic agents (outpatient)  - Persistent tachycardia but is sinus tachycardia  - Outpatient pulm follow up     #Leukocytosis likely inflammatory   - WBC 14K   - No signs of infection   - started azithro    #Hypothyroidism  - c/w Synthroid    #Pre- diabetes on Metformin?  - a1c in am  - Monitor FS  - Start ISS or insulin premeals/basal if needed     #HTN  - c/w Amlodipine    #HLD  - c/w Atorvastatin     Activity: IAT  Diet: DASH/TLC  DVT ppx: Lovenox  GI ppx: none     #SOB and non- productive cough likely secondary to viral bronchitis vs reactive airway  - pt has no hx of asthma, non-smoker but her  smokes  - In ED, vitals significant for  RR 22 and SpO2 99% on mask.  - Laboratory workup significant for WBC 14K   - ABG's pH 7.36 pCO2 43 pO2 27 HCO3 24 Lactate 2.2  - RVP and COVID negative   - CTA chest: No evidence of acute pulmonary embolus or acute thoracic pathology.  - Chest X-ray: No radiographic evidence of acute cardiopulmonary disease.  - s/p 125 mcg Solumedrol, Magnesium 2 g and Duonebs at her PCP's office   - c/w Solumedrol 40mg q12 and Duonebs q6  - Pulmonology consulted  - wean oxygen as tolerated   - Significant peripheral eosinophilia noted; check IGE level when off steroids  - check Aspergillus IgG, IgE as well  - Start Symbicort 160 BID; Albuterol as needed  - Recommend Prednisone 40mg daily to be tapered slowly over 10-14 days   - Recommend outpatient allergist evaluation   - Persistent tachycardia but is sinus tachycardia  - Outpatient pulm follow up   - echo to look for pericarditis; pt has pleuritic chest pain; started ibuprofen    #Leukocytosis likely inflammatory   - WBC 14K   - No signs of infection   - started azithro    #Hypothyroidism  - c/w Synthroid    #Pre- diabetes on Metformin?  - a1c in am  - Monitor FS  - Start ISS or insulin premeals/basal if needed     #HTN  - c/w Amlodipine    #HLD  - c/w Atorvastatin     Activity: IAT  Diet: DASH/TLC  DVT ppx: Lovenox  GI ppx: none

## 2022-12-15 NOTE — PROGRESS NOTE ADULT - SUBJECTIVE AND OBJECTIVE BOX
pt seen and examined    still has sob   has chest discomfort with deep breathing, feels tightness, not related to activity ,    ROS: no n/v, no fever    Vital Signs Last 24 Hrs  T(C): 36.1 (15 Dec 2022 04:53), Max: 36.8 (14 Dec 2022 20:39)  T(F): 96.9 (15 Dec 2022 04:53), Max: 98.3 (14 Dec 2022 20:39)  HR: 94 (15 Dec 2022 06:00) (94 - 117)  BP: 126/80 (15 Dec 2022 06:00) (112/71 - 155/85)  RR: 18 (15 Dec 2022 06:00) (18 - 22)  SpO2: 93% (15 Dec 2022 08:36) (90% - 99%)    Parameters below as of 15 Dec 2022 08:36  Patient On (Oxygen Delivery Method): nasal cannula  O2 Flow (L/min): 2      physical exam  constitutional NAD, AAOX3, Respiratory  lungs bilat mild wheezing, CVS heart RRR, GI: abdomen Soft NT, ND, BS+, skin: intact  neuro exam Motor, sensory and CN normal, no deficit     MEDICATIONS  (STANDING):  albuterol/ipratropium for Nebulization 3 milliLiter(s) Nebulizer every 6 hours  amLODIPine   Tablet 2.5 milliGRAM(s) Oral daily  aspirin enteric coated 81 milliGRAM(s) Oral daily  atorvastatin 20 milliGRAM(s) Oral at bedtime  azithromycin   Tablet 500 milliGRAM(s) Oral daily  enoxaparin Injectable 40 milliGRAM(s) SubCutaneous every 24 hours  ibuprofen  Tablet. 200 milliGRAM(s) Oral every 6 hours  levothyroxine 88 MICROGram(s) Oral daily  methylPREDNISolone sodium succinate Injectable 40 milliGRAM(s) IV Push daily  pantoprazole    Tablet 40 milliGRAM(s) Oral before breakfast    MEDICATIONS  (PRN):  albuterol/ipratropium for Nebulization 3 milliLiter(s) Nebulizer every 4 hours PRN Shortness of Breath and/or Wheezing    CAPILLARY BLOOD GLUCOSE      POCT Blood Glucose.: 110 mg/dL (15 Dec 2022 08:10)                          12.8   13.88 )-----------( 376      ( 15 Dec 2022 06:47 )             39.1     12-15    141  |  106  |  11  ----------------------------<  135<H>  4.0   |  24  |  0.7    Ca    8.9      15 Dec 2022 06:47  Phos  3.5     12-15  Mg     2.3     12-15    TPro  6.9  /  Alb  4.1  /  TBili  0.4  /  DBili  x   /  AST  12  /  ALT  20  /  AlkPhos  108  12-15    D-Dimer Assay, Quantitative: 514 ng/mL DDU (12-14-22 @ 13:43)    CARDIAC MARKERS ( 14 Dec 2022 13:43 )  x     / <0.01 ng/mL / x     / x     / x          PT/INR - ( 14 Dec 2022 13:43 )   PT: 10.90 sec;   INR: 0.96 ratio         PTT - ( 14 Dec 2022 13:43 )  PTT:36.9 sec    < from: Xray Chest 1 View- PORTABLE-Urgent (12.14.22 @ 14:07) >  No radiographic evidence of acute cardiopulmonary disease.    < end of copied text >    a/p  # hyperactive airway disease , probably due to recent acute viral infection/bronchitis,   no hx of asthma   non smoker   cont steroids and nebs     # leukocytosis is due to steroids   # mid chest discomfort with deep breathing, rule out pericardial disease:  check echo , can use ibuprofen for pain     #Progress Note Handoff  Pending (specify): clinical improvement   Family discussion: shane pt   Disposition: Home when stable

## 2022-12-15 NOTE — CONSULT NOTE ADULT - ASSESSMENT
IMPRESSION:  Dyspnea/ Cough 2/2 Perimenstrual Asthma   HO Tachycardia  HO anxiety/panic attacks  HO hypothyroidism        PLAN:  Full RVP panel  Prednisone 40mg x 5 days  Start Symbicort 80/ 4.5 2 puffs BID  Albuterol prn  cardio eval  ECHO  OP pulm follow up     IMPRESSION:    Asthma exacerbation  Dyspnea/ Cough  HO Tachycardia  HO anxiety/panic attacks  HO hypothyroidism      PLAN:    CXR and CTA chest with no pulmonary pathology; no PE  RVP panel sent and is negative   Significant peripheral eosinophilia noted; check IGE level when off steroids  Start Symbicort 160 BID; Albuterol as needed  Recommend Prednisone 40mg daily to be tapered slowly over 10-14 days   Recommend outpatient allergist evaluation   Trigger avoidance; if asthma remains uncontrolled then she will qualify for injectable biologic agents (outpatient)  Persistent tachycardia; cardiology evaluation   Continue DVT ppx; on room air   Outpatient pulm follow up      IMPRESSION:    Asthma exacerbation?   Peripheral eosinophilia   Dyspnea/ Cough  HO Tachycardia  HO anxiety/panic attacks  HO hypothyroidism      PLAN:    CXR and CTA chest reviewed with no pulmonary pathology; no PE  RVP panel sent and is negative   Significant peripheral eosinophilia noted; check IGE level when off steroids  Recommend to check Aspergillus IgG, IgE as well  Start Symbicort 160 BID; Albuterol as needed  Recommend Prednisone 40mg daily to be tapered slowly over 10-14 days   Recommend outpatient allergist evaluation   Trigger avoidance; if asthma remains uncontrolled then she will qualify for injectable biologic agents (outpatient)  Persistent tachycardia; cardiology evaluation   Continue DVT ppx; on room air   Outpatient pulm follow up

## 2022-12-16 LAB
ALBUMIN SERPL ELPH-MCNC: 3.5 G/DL — SIGNIFICANT CHANGE UP (ref 3.5–5.2)
ALP SERPL-CCNC: 85 U/L — SIGNIFICANT CHANGE UP (ref 30–115)
ALT FLD-CCNC: 16 U/L — SIGNIFICANT CHANGE UP (ref 0–41)
ANION GAP SERPL CALC-SCNC: 12 MMOL/L — SIGNIFICANT CHANGE UP (ref 7–14)
APPEARANCE UR: CLEAR — SIGNIFICANT CHANGE UP
AST SERPL-CCNC: 10 U/L — SIGNIFICANT CHANGE UP (ref 0–41)
BASOPHILS # BLD AUTO: 0.09 K/UL — SIGNIFICANT CHANGE UP (ref 0–0.2)
BASOPHILS NFR BLD AUTO: 0.8 % — SIGNIFICANT CHANGE UP (ref 0–1)
BILIRUB SERPL-MCNC: 0.3 MG/DL — SIGNIFICANT CHANGE UP (ref 0.2–1.2)
BILIRUB UR-MCNC: NEGATIVE — SIGNIFICANT CHANGE UP
BUN SERPL-MCNC: 13 MG/DL — SIGNIFICANT CHANGE UP (ref 10–20)
CALCIUM SERPL-MCNC: 8.1 MG/DL — LOW (ref 8.4–10.5)
CHLORIDE SERPL-SCNC: 105 MMOL/L — SIGNIFICANT CHANGE UP (ref 98–110)
CO2 SERPL-SCNC: 24 MMOL/L — SIGNIFICANT CHANGE UP (ref 17–32)
COLOR SPEC: SIGNIFICANT CHANGE UP
CREAT SERPL-MCNC: 0.7 MG/DL — SIGNIFICANT CHANGE UP (ref 0.7–1.5)
DIFF PNL FLD: NEGATIVE — SIGNIFICANT CHANGE UP
EGFR: 110 ML/MIN/1.73M2 — SIGNIFICANT CHANGE UP
EOSINOPHIL # BLD AUTO: 0.65 K/UL — SIGNIFICANT CHANGE UP (ref 0–0.7)
EOSINOPHIL NFR BLD AUTO: 5.5 % — SIGNIFICANT CHANGE UP (ref 0–8)
GLUCOSE BLDC GLUCOMTR-MCNC: 114 MG/DL — HIGH (ref 70–99)
GLUCOSE BLDC GLUCOMTR-MCNC: 146 MG/DL — HIGH (ref 70–99)
GLUCOSE BLDC GLUCOMTR-MCNC: 167 MG/DL — HIGH (ref 70–99)
GLUCOSE BLDC GLUCOMTR-MCNC: 187 MG/DL — HIGH (ref 70–99)
GLUCOSE BLDC GLUCOMTR-MCNC: 97 MG/DL — SIGNIFICANT CHANGE UP (ref 70–99)
GLUCOSE SERPL-MCNC: 91 MG/DL — SIGNIFICANT CHANGE UP (ref 70–99)
GLUCOSE UR QL: NEGATIVE — SIGNIFICANT CHANGE UP
HCT VFR BLD CALC: 37 % — SIGNIFICANT CHANGE UP (ref 37–47)
HGB BLD-MCNC: 11.7 G/DL — LOW (ref 12–16)
IMM GRANULOCYTES NFR BLD AUTO: 0.3 % — SIGNIFICANT CHANGE UP (ref 0.1–0.3)
KETONES UR-MCNC: NEGATIVE — SIGNIFICANT CHANGE UP
LEUKOCYTE ESTERASE UR-ACNC: NEGATIVE — SIGNIFICANT CHANGE UP
LYMPHOCYTES # BLD AUTO: 3.63 K/UL — HIGH (ref 1.2–3.4)
LYMPHOCYTES # BLD AUTO: 30.7 % — SIGNIFICANT CHANGE UP (ref 20.5–51.1)
MAGNESIUM SERPL-MCNC: 1.9 MG/DL — SIGNIFICANT CHANGE UP (ref 1.8–2.4)
MCHC RBC-ENTMCNC: 22.8 PG — LOW (ref 27–31)
MCHC RBC-ENTMCNC: 31.6 G/DL — LOW (ref 32–37)
MCV RBC AUTO: 72.1 FL — LOW (ref 81–99)
MONOCYTES # BLD AUTO: 0.72 K/UL — HIGH (ref 0.1–0.6)
MONOCYTES NFR BLD AUTO: 6.1 % — SIGNIFICANT CHANGE UP (ref 1.7–9.3)
NEUTROPHILS # BLD AUTO: 6.72 K/UL — HIGH (ref 1.4–6.5)
NEUTROPHILS NFR BLD AUTO: 56.6 % — SIGNIFICANT CHANGE UP (ref 42.2–75.2)
NITRITE UR-MCNC: NEGATIVE — SIGNIFICANT CHANGE UP
NRBC # BLD: 0 /100 WBCS — SIGNIFICANT CHANGE UP (ref 0–0)
PH UR: 5.5 — SIGNIFICANT CHANGE UP (ref 5–8)
PLATELET # BLD AUTO: 342 K/UL — SIGNIFICANT CHANGE UP (ref 130–400)
POTASSIUM SERPL-MCNC: 4.1 MMOL/L — SIGNIFICANT CHANGE UP (ref 3.5–5)
POTASSIUM SERPL-SCNC: 4.1 MMOL/L — SIGNIFICANT CHANGE UP (ref 3.5–5)
PROT SERPL-MCNC: 5.9 G/DL — LOW (ref 6–8)
PROT UR-MCNC: NEGATIVE — SIGNIFICANT CHANGE UP
RBC # BLD: 5.13 M/UL — SIGNIFICANT CHANGE UP (ref 4.2–5.4)
RBC # FLD: 16.2 % — HIGH (ref 11.5–14.5)
SODIUM SERPL-SCNC: 141 MMOL/L — SIGNIFICANT CHANGE UP (ref 135–146)
SP GR SPEC: 1.01 — SIGNIFICANT CHANGE UP (ref 1.01–1.03)
UROBILINOGEN FLD QL: SIGNIFICANT CHANGE UP
WBC # BLD: 11.84 K/UL — HIGH (ref 4.8–10.8)
WBC # FLD AUTO: 11.84 K/UL — HIGH (ref 4.8–10.8)

## 2022-12-16 PROCEDURE — 99232 SBSQ HOSP IP/OBS MODERATE 35: CPT

## 2022-12-16 PROCEDURE — 99233 SBSQ HOSP IP/OBS HIGH 50: CPT

## 2022-12-16 RX ORDER — DEXTROSE 50 % IN WATER 50 %
15 SYRINGE (ML) INTRAVENOUS ONCE
Refills: 0 | Status: DISCONTINUED | OUTPATIENT
Start: 2022-12-16 | End: 2022-12-17

## 2022-12-16 RX ORDER — GUAIFENESIN/DEXTROMETHORPHAN 600MG-30MG
10 TABLET, EXTENDED RELEASE 12 HR ORAL EVERY 6 HOURS
Refills: 0 | Status: DISCONTINUED | OUTPATIENT
Start: 2022-12-16 | End: 2022-12-17

## 2022-12-16 RX ORDER — SODIUM CHLORIDE 9 MG/ML
1000 INJECTION, SOLUTION INTRAVENOUS
Refills: 0 | Status: DISCONTINUED | OUTPATIENT
Start: 2022-12-16 | End: 2022-12-17

## 2022-12-16 RX ORDER — INSULIN LISPRO 100/ML
VIAL (ML) SUBCUTANEOUS
Refills: 0 | Status: DISCONTINUED | OUTPATIENT
Start: 2022-12-16 | End: 2022-12-17

## 2022-12-16 RX ORDER — DEXTROSE 50 % IN WATER 50 %
25 SYRINGE (ML) INTRAVENOUS ONCE
Refills: 0 | Status: DISCONTINUED | OUTPATIENT
Start: 2022-12-16 | End: 2022-12-17

## 2022-12-16 RX ORDER — DEXTROSE 50 % IN WATER 50 %
12.5 SYRINGE (ML) INTRAVENOUS ONCE
Refills: 0 | Status: DISCONTINUED | OUTPATIENT
Start: 2022-12-16 | End: 2022-12-17

## 2022-12-16 RX ORDER — GLUCAGON INJECTION, SOLUTION 0.5 MG/.1ML
1 INJECTION, SOLUTION SUBCUTANEOUS ONCE
Refills: 0 | Status: DISCONTINUED | OUTPATIENT
Start: 2022-12-16 | End: 2022-12-17

## 2022-12-16 RX ADMIN — Medication 200 MILLIGRAM(S): at 17:12

## 2022-12-16 RX ADMIN — Medication 200 MILLIGRAM(S): at 23:13

## 2022-12-16 RX ADMIN — AMLODIPINE BESYLATE 2.5 MILLIGRAM(S): 2.5 TABLET ORAL at 06:34

## 2022-12-16 RX ADMIN — Medication 3 MILLILITER(S): at 20:36

## 2022-12-16 RX ADMIN — Medication 40 MILLIGRAM(S): at 06:34

## 2022-12-16 RX ADMIN — Medication 81 MILLIGRAM(S): at 11:42

## 2022-12-16 RX ADMIN — Medication 3 MILLILITER(S): at 08:56

## 2022-12-16 RX ADMIN — Medication 200 MILLIGRAM(S): at 06:34

## 2022-12-16 RX ADMIN — Medication 3 MILLILITER(S): at 02:55

## 2022-12-16 RX ADMIN — Medication 200 MILLIGRAM(S): at 11:42

## 2022-12-16 RX ADMIN — AZITHROMYCIN 500 MILLIGRAM(S): 500 TABLET, FILM COATED ORAL at 11:42

## 2022-12-16 RX ADMIN — Medication 200 MILLIGRAM(S): at 07:04

## 2022-12-16 RX ADMIN — Medication 10 MILLILITER(S): at 17:12

## 2022-12-16 RX ADMIN — Medication 10 MILLILITER(S): at 06:53

## 2022-12-16 RX ADMIN — Medication 10 MILLILITER(S): at 23:15

## 2022-12-16 RX ADMIN — PANTOPRAZOLE SODIUM 40 MILLIGRAM(S): 20 TABLET, DELAYED RELEASE ORAL at 06:34

## 2022-12-16 RX ADMIN — ATORVASTATIN CALCIUM 20 MILLIGRAM(S): 80 TABLET, FILM COATED ORAL at 21:45

## 2022-12-16 RX ADMIN — Medication 88 MICROGRAM(S): at 06:34

## 2022-12-16 RX ADMIN — Medication 3 MILLILITER(S): at 13:48

## 2022-12-16 NOTE — PROGRESS NOTE ADULT - SUBJECTIVE AND OBJECTIVE BOX
Patient is a 42y old  Female who presents with a chief complaint of Asthma exacerbation (16 Dec 2022 10:36)        Over Night Events:    On room air   Still has a cough   Minimal wheeze   No fever         ROS:  See HPI    PHYSICAL EXAM    ICU Vital Signs Last 24 Hrs  T(C): 36.2 (16 Dec 2022 05:00), Max: 36.7 (15 Dec 2022 13:51)  T(F): 97.1 (16 Dec 2022 05:00), Max: 98 (15 Dec 2022 13:51)  HR: 95 (16 Dec 2022 10:30) (80 - 95)  BP: 121/79 (16 Dec 2022 10:30) (118/71 - 126/86)  BP(mean): --  ABP: --  ABP(mean): --  RR: 16 (16 Dec 2022 10:30) (15 - 18)  SpO2: 95% (15 Dec 2022 21:20) (95% - 95%)    O2 Parameters below as of 15 Dec 2022 21:20  Patient On (Oxygen Delivery Method): nasal cannula            General: NAD  HEENT: ELIZABETH             Lymphatic system: No cervical LN   Lungs: cough with deep inspiration, wheeze   Cardiovascular: Regular   Gastrointestinal: Soft, Positive BS  Extremities: No clubbing.  Moves extremities.  Full Range of motion   Skin: Warm, intact  Neurological: No motor or sensory deficit         LABS:                            11.7   11.84 )-----------( 342      ( 16 Dec 2022 05:54 )             37.0                                               12-16    141  |  105  |  13  ----------------------------<  91  4.1   |  24  |  0.7    Ca    8.1<L>      16 Dec 2022 05:54  Phos  3.5     12-15  Mg     1.9     12-16    TPro  5.9<L>  /  Alb  3.5  /  TBili  0.3  /  DBili  x   /  AST  10  /  ALT  16  /  AlkPhos  85  12-16      PT/INR - ( 14 Dec 2022 13:43 )   PT: 10.90 sec;   INR: 0.96 ratio         PTT - ( 14 Dec 2022 13:43 )  PTT:36.9 sec                                           CARDIAC MARKERS ( 14 Dec 2022 13:43 )  x     / <0.01 ng/mL / x     / x     / x                                                LIVER FUNCTIONS - ( 16 Dec 2022 05:54 )  Alb: 3.5 g/dL / Pro: 5.9 g/dL / ALK PHOS: 85 U/L / ALT: 16 U/L / AST: 10 U/L / GGT: x                                                                                                                                       MEDICATIONS  (STANDING):  albuterol/ipratropium for Nebulization 3 milliLiter(s) Nebulizer every 6 hours  amLODIPine   Tablet 2.5 milliGRAM(s) Oral daily  aspirin enteric coated 81 milliGRAM(s) Oral daily  atorvastatin 20 milliGRAM(s) Oral at bedtime  azithromycin   Tablet 500 milliGRAM(s) Oral daily  budesonide 160 MICROgram(s)/formoterol 4.5 MICROgram(s) Inhaler 2 Puff(s) Inhalation two times a day  enoxaparin Injectable 40 milliGRAM(s) SubCutaneous every 24 hours  ibuprofen  Tablet. 200 milliGRAM(s) Oral every 6 hours  levothyroxine 88 MICROGram(s) Oral daily  methylPREDNISolone sodium succinate Injectable 40 milliGRAM(s) IV Push daily  pantoprazole    Tablet 40 milliGRAM(s) Oral before breakfast    MEDICATIONS  (PRN):  albuterol/ipratropium for Nebulization 3 milliLiter(s) Nebulizer every 4 hours PRN Shortness of Breath and/or Wheezing  guaifenesin/dextromethorphan Oral Liquid 10 milliLiter(s) Oral every 6 hours PRN Cough

## 2022-12-16 NOTE — PROGRESS NOTE ADULT - ASSESSMENT
IMPRESSION:    Asthma exacerbation?   Peripheral eosinophilia   Dyspnea/ Cough  HO Tachycardia  HO anxiety/panic attacks  HO hypothyroidism      PLAN:    CXR and CTA chest reviewed with no pulmonary pathology; no PE  RVP panel sent and is negative   Significant peripheral eosinophilia noted; check IGE level when off steroids  Recommend to check Aspergillus IgG, IgE as well; this can all be done outpatient   Continue Symbicort 160 BID; Albuterol as needed  Recommend Prednisone 40mg daily to be tapered slowly over 10-14 days   Recommend outpatient allergist evaluation   Trigger avoidance; if asthma remains uncontrolled then she will qualify for injectable biologic agents (outpatient)  Persistent tachycardia; cardiology evaluation   Continue DVT ppx; on room air   Outpatient pulm follow up

## 2022-12-16 NOTE — PROGRESS NOTE ADULT - ATTENDING COMMENTS
a/p  # sob, hyperactive airway disease,   Flu With COVID-19 By PAIGE (12.14.22 @ 13:43)    SARS-CoV-2 Result: NotDetec    Influenza A Result: NotDetec    Influenza B Result: NotDetec    Resp Syn Virus Result: NotDetec: The [employer] m Resp-4-Plex assay is a multiplex real-time reverse    cont current management     check Aspergillus IgG, IgE    #Progress Note Handoff  Pending (specify):  Clinical improvement   Family discussion: shane pt   Disposition: Home_ when stable

## 2022-12-16 NOTE — PROGRESS NOTE ADULT - ASSESSMENT
#SOB and non- productive cough likely secondary to viral bronchitis vs reactive airway  - pt has no hx of asthma, non-smoker but her  smokes  - In ED, vitals significant for  RR 22 and SpO2 99% on mask.  - Laboratory workup significant for WBC 14K   - ABG's pH 7.36 pCO2 43 pO2 27 HCO3 24 Lactate 2.2  - RVP and COVID negative   - CTA chest: No evidence of acute pulmonary embolus or acute thoracic pathology.  - Chest X-ray: No radiographic evidence of acute cardiopulmonary disease.  - s/p 125 mcg Solumedrol, Magnesium 2 g and Duonebs at her PCP's office   - c/w Solumedrol 40mg q12 and Duonebs q6  - Pulmonology consulted  - wean oxygen as tolerated   - Significant peripheral eosinophilia noted; check IGE level when off steroids  - f/u Aspergillus IgG, IgE   - Start Symbicort 160 BID; Albuterol as needed  - Recommend Prednisone 40mg daily to be tapered slowly over 10-14 days   - Recommend outpatient allergist evaluation   - Persistent tachycardia but is sinus tachycardia  - Outpatient pulm follow up   - f/u echo to look for pericarditis; pt has pleuritic chest pain; started ibuprofen    #Leukocytosis likely inflammatory   - WBC 14K   - No signs of infection   - c/w azithro    #Hypothyroidism  - c/w Synthroid    #Pre- diabetes on Metformin?  - a1c 5.8  - Monitor FS    #HTN  - c/w Amlodipine    #HLD  - c/w Atorvastatin     Activity: IAT  Diet: DASH/TLC  DVT ppx: Lovenox  GI ppx: none  Dispo: possible DC tomorrow     #SOB and non- productive cough likely secondary to viral bronchitis vs reactive airway  - pt has no hx of asthma, non-smoker but her  smokes  - In ED, vitals significant for  RR 22 and SpO2 99% on mask.  - Laboratory workup significant for WBC 14K   - ABG's pH 7.36 pCO2 43 pO2 27 HCO3 24 Lactate 2.2  - RVP and COVID negative   - CTA chest: No evidence of acute pulmonary embolus or acute thoracic pathology.  - Chest X-ray: No radiographic evidence of acute cardiopulmonary disease.  - s/p 125 mcg Solumedrol, Magnesium 2 g and Duonebs at her PCP's office   - c/w Solumedrol 40mg q12 and Duonebs q6  - Pulmonology consulted  - wean oxygen as tolerated   - Significant peripheral eosinophilia noted; check IGE level when off steroids  - f/u Aspergillus IgG, IgE   - Start Symbicort 160 BID; Albuterol as needed  - Recommend Prednisone 40mg daily to be tapered slowly over 10-14 days   - Recommend outpatient allergist evaluation   - Persistent tachycardia but is sinus tachycardia  - Outpatient pulm follow up   - f/u echo to look for pericarditis; pt has pleuritic chest pain; started ibuprofen    #Leukocytosis likely inflammatory   - WBC 14K   - No signs of infection   - c/w azithro    #dysuria  -UA and ucx    #Hypothyroidism  - c/w Synthroid    #Pre- diabetes on Metformin?  - a1c 5.8  - Monitor FS    #HTN  - c/w Amlodipine    #HLD  - c/w Atorvastatin     Activity: IAT  Diet: DASH/TLC  DVT ppx: Lovenox  GI ppx: none  Dispo: possible DC tomorrow

## 2022-12-16 NOTE — PROGRESS NOTE ADULT - SUBJECTIVE AND OBJECTIVE BOX
LENGTH OF HOSPITAL STAY: 2d    Overnight events: none  Complaints: feels breathing is worse than prior; nebulizers help     CHIEF COMPLAINT:   Patient is a 42y old  Female who presents with a chief complaint of Asthma exacerbation (15 Dec 2022 14:22)        HISTORY OF PRESENTING ILLNESS:    HPI:  42 year old patient, known to have HTN, HLD, pre DM, hypothyroidism and history of panic attacks, presented to ED with SOB of 5 days duration.   History goes back to Saturday when patient started having SOB, chest tightness and non-productive cough. She went to her PCP and was prescribed Albuterol (used 3 inhalers so far). Her symptoms did not improve so she went to her PCP again, her saturation was 85% on RA so she was placed on O2, given 125 mcg Solumedrol, Magnesium 2 g and Duonebs and was sent to the ED.   She denies fever, chills, chest pain, URT, abdominal or urinary symptoms. She never had a similar episode before. She was never diagnosed with Asthma.   No seasonal allergies, no eczema, no food allergies. No family history of asthma.     Since May 2022, patient had 2 episodes of panic attacks for which she presented to ED for 24 hours monitoring. During both episodes, she had palpitations, dizziness and SOB.     In ED, vitals significant for  RR 22 and SpO2 99% on mask.  Laboratory workup significant for WBC 14K   ABG's pH 7.36 pCO2 43 pO2 27 HCO3 24 Lactate 2.2  RVP and COVID negative   CTA chest: No evidence of acute pulmonary embolus or acute thoracic pathology.  Chest X-ray: No radiographic evidence of acute cardiopulmonary disease.  Admitted for clinical picture of asthma exacerbation.  On examination, patient on RA with 95% saturation; still tachycardic.  (14 Dec 2022 19:59)    PAST MEDICAL & SURGICAL HISTORY  PAST MEDICAL & SURGICAL HISTORY:    SOCIAL HISTORY:    ALLERGIES:  No Known Allergies    MEDICATIONS:  STANDING MEDICATIONS  albuterol/ipratropium for Nebulization 3 milliLiter(s) Nebulizer every 6 hours  amLODIPine   Tablet 2.5 milliGRAM(s) Oral daily  aspirin enteric coated 81 milliGRAM(s) Oral daily  atorvastatin 20 milliGRAM(s) Oral at bedtime  azithromycin   Tablet 500 milliGRAM(s) Oral daily  budesonide 160 MICROgram(s)/formoterol 4.5 MICROgram(s) Inhaler 2 Puff(s) Inhalation two times a day  enoxaparin Injectable 40 milliGRAM(s) SubCutaneous every 24 hours  ibuprofen  Tablet. 200 milliGRAM(s) Oral every 6 hours  levothyroxine 88 MICROGram(s) Oral daily  methylPREDNISolone sodium succinate Injectable 40 milliGRAM(s) IV Push daily  pantoprazole    Tablet 40 milliGRAM(s) Oral before breakfast    PRN MEDICATIONS  albuterol/ipratropium for Nebulization 3 milliLiter(s) Nebulizer every 4 hours PRN  guaifenesin/dextromethorphan Oral Liquid 10 milliLiter(s) Oral every 6 hours PRN    VITALS:   T(F): 97.1  HR: 80  BP: 126/86  RR: 18  SpO2: 95%    LABS:                        11.7   11.84 )-----------( 342      ( 16 Dec 2022 05:54 )             37.0     12-16    141  |  105  |  13  ----------------------------<  91  4.1   |  24  |  0.7    Ca    8.1<L>      16 Dec 2022 05:54  Phos  3.5     12-15  Mg     1.9     12-16    TPro  5.9<L>  /  Alb  3.5  /  TBili  0.3  /  DBili  x   /  AST  10  /  ALT  16  /  AlkPhos  85  12-16    PT/INR - ( 14 Dec 2022 13:43 )   PT: 10.90 sec;   INR: 0.96 ratio         PTT - ( 14 Dec 2022 13:43 )  PTT:36.9 sec          CARDIAC MARKERS ( 14 Dec 2022 13:43 )  x     / <0.01 ng/mL / x     / x     / x            PHYSICAL EXAM:  GEN: No acute distress, sitting in chair  LUNGS: Normal respiratory effort. +wheezing. on RA  HEART: S1/S2 present. RRR. no murmurs  ABD: Soft, non-tender, non-distended  EXT: no cyanosis or edema  NEURO: AAOX3, no focal deficits     LENGTH OF HOSPITAL STAY: 2d    Overnight events: none  Complaints: feels breathing is not better than prior; nebulizers help     CHIEF COMPLAINT:   Patient is a 42y old  Female who presents with a chief complaint of Asthma exacerbation (15 Dec 2022 14:22)    ROS no cp, no n/v still has sob     HISTORY OF PRESENTING ILLNESS:    HPI:  42 year old patient, known to have HTN, HLD, pre DM, hypothyroidism and history of panic attacks, presented to ED with SOB of 5 days duration.   History goes back to Saturday when patient started having SOB, chest tightness and non-productive cough. She went to her PCP and was prescribed Albuterol (used 3 inhalers so far). Her symptoms did not improve so she went to her PCP again, her saturation was 85% on RA so she was placed on O2, given 125 mcg Solumedrol, Magnesium 2 g and Duonebs and was sent to the ED.   She denies fever, chills, chest pain, URT, abdominal or urinary symptoms. She never had a similar episode before. She was never diagnosed with Asthma.   No seasonal allergies, no eczema, no food allergies. No family history of asthma.     Since May 2022, patient had 2 episodes of panic attacks for which she presented to ED for 24 hours monitoring. During both episodes, she had palpitations, dizziness and SOB.     In ED, vitals significant for  RR 22 and SpO2 99% on mask.  Laboratory workup significant for WBC 14K   ABG's pH 7.36 pCO2 43 pO2 27 HCO3 24 Lactate 2.2  RVP and COVID negative   CTA chest: No evidence of acute pulmonary embolus or acute thoracic pathology.  Chest X-ray: No radiographic evidence of acute cardiopulmonary disease.  Admitted for clinical picture of asthma exacerbation.  On examination, patient on RA with 95% saturation; still tachycardic.  (14 Dec 2022 19:59)    PAST MEDICAL & SURGICAL HISTORY  PAST MEDICAL & SURGICAL HISTORY:    SOCIAL HISTORY:    ALLERGIES:  No Known Allergies    MEDICATIONS:  STANDING MEDICATIONS  albuterol/ipratropium for Nebulization 3 milliLiter(s) Nebulizer every 6 hours  amLODIPine   Tablet 2.5 milliGRAM(s) Oral daily  aspirin enteric coated 81 milliGRAM(s) Oral daily  atorvastatin 20 milliGRAM(s) Oral at bedtime  azithromycin   Tablet 500 milliGRAM(s) Oral daily  budesonide 160 MICROgram(s)/formoterol 4.5 MICROgram(s) Inhaler 2 Puff(s) Inhalation two times a day  enoxaparin Injectable 40 milliGRAM(s) SubCutaneous every 24 hours  ibuprofen  Tablet. 200 milliGRAM(s) Oral every 6 hours  levothyroxine 88 MICROGram(s) Oral daily  methylPREDNISolone sodium succinate Injectable 40 milliGRAM(s) IV Push daily  pantoprazole    Tablet 40 milliGRAM(s) Oral before breakfast    PRN MEDICATIONS  albuterol/ipratropium for Nebulization 3 milliLiter(s) Nebulizer every 4 hours PRN  guaifenesin/dextromethorphan Oral Liquid 10 milliLiter(s) Oral every 6 hours PRN    VITALS:   T(F): 97.1  HR: 80  BP: 126/86  RR: 18  SpO2: 95%    LABS:                        11.7   11.84 )-----------( 342      ( 16 Dec 2022 05:54 )             37.0     12-16    141  |  105  |  13  ----------------------------<  91  4.1   |  24  |  0.7    Ca    8.1<L>      16 Dec 2022 05:54  Phos  3.5     12-15  Mg     1.9     12-16    TPro  5.9<L>  /  Alb  3.5  /  TBili  0.3  /  DBili  x   /  AST  10  /  ALT  16  /  AlkPhos  85  12-16    PT/INR - ( 14 Dec 2022 13:43 )   PT: 10.90 sec;   INR: 0.96 ratio         PTT - ( 14 Dec 2022 13:43 )  PTT:36.9 sec          CARDIAC MARKERS ( 14 Dec 2022 13:43 )  x     / <0.01 ng/mL / x     / x     / x            PHYSICAL EXAM:  GEN: No acute distress, sitting in chair  LUNGS: Normal respiratory effort. +wheezing. on RA  HEART: S1/S2 present. RRR. no murmurs  ABD: Soft, non-tender, non-distended  EXT: no cyanosis or edema  NEURO: AAOX3, no focal deficits

## 2022-12-17 ENCOUNTER — TRANSCRIPTION ENCOUNTER (OUTPATIENT)
Age: 42
End: 2022-12-17

## 2022-12-17 VITALS
HEART RATE: 66 BPM | RESPIRATION RATE: 17 BRPM | SYSTOLIC BLOOD PRESSURE: 117 MMHG | DIASTOLIC BLOOD PRESSURE: 62 MMHG | TEMPERATURE: 98 F

## 2022-12-17 LAB
ALBUMIN SERPL ELPH-MCNC: 3.9 G/DL — SIGNIFICANT CHANGE UP (ref 3.5–5.2)
ALP SERPL-CCNC: 90 U/L — SIGNIFICANT CHANGE UP (ref 30–115)
ALT FLD-CCNC: 14 U/L — SIGNIFICANT CHANGE UP (ref 0–41)
ANION GAP SERPL CALC-SCNC: 9 MMOL/L — SIGNIFICANT CHANGE UP (ref 7–14)
AST SERPL-CCNC: 9 U/L — SIGNIFICANT CHANGE UP (ref 0–41)
BASOPHILS # BLD AUTO: 0.08 K/UL — SIGNIFICANT CHANGE UP (ref 0–0.2)
BASOPHILS NFR BLD AUTO: 0.6 % — SIGNIFICANT CHANGE UP (ref 0–1)
BILIRUB SERPL-MCNC: 0.3 MG/DL — SIGNIFICANT CHANGE UP (ref 0.2–1.2)
BUN SERPL-MCNC: 13 MG/DL — SIGNIFICANT CHANGE UP (ref 10–20)
CALCIUM SERPL-MCNC: 8.4 MG/DL — SIGNIFICANT CHANGE UP (ref 8.4–10.5)
CHLORIDE SERPL-SCNC: 103 MMOL/L — SIGNIFICANT CHANGE UP (ref 98–110)
CO2 SERPL-SCNC: 25 MMOL/L — SIGNIFICANT CHANGE UP (ref 17–32)
CREAT SERPL-MCNC: 0.6 MG/DL — LOW (ref 0.7–1.5)
CRP SERPL-MCNC: <3 MG/L — SIGNIFICANT CHANGE UP
CULTURE RESULTS: SIGNIFICANT CHANGE UP
EGFR: 114 ML/MIN/1.73M2 — SIGNIFICANT CHANGE UP
EOSINOPHIL # BLD AUTO: 0.13 K/UL — SIGNIFICANT CHANGE UP (ref 0–0.7)
EOSINOPHIL NFR BLD AUTO: 0.9 % — SIGNIFICANT CHANGE UP (ref 0–8)
ERYTHROCYTE [SEDIMENTATION RATE] IN BLOOD: 6 MM/HR — SIGNIFICANT CHANGE UP (ref 0–20)
GLUCOSE BLDC GLUCOMTR-MCNC: 248 MG/DL — HIGH (ref 70–99)
GLUCOSE BLDC GLUCOMTR-MCNC: 305 MG/DL — HIGH (ref 70–99)
GLUCOSE BLDC GLUCOMTR-MCNC: 96 MG/DL — SIGNIFICANT CHANGE UP (ref 70–99)
GLUCOSE SERPL-MCNC: 90 MG/DL — SIGNIFICANT CHANGE UP (ref 70–99)
HCT VFR BLD CALC: 36.8 % — LOW (ref 37–47)
HGB BLD-MCNC: 11.9 G/DL — LOW (ref 12–16)
IMM GRANULOCYTES NFR BLD AUTO: 0.4 % — HIGH (ref 0.1–0.3)
LYMPHOCYTES # BLD AUTO: 24 % — SIGNIFICANT CHANGE UP (ref 20.5–51.1)
LYMPHOCYTES # BLD AUTO: 3.33 K/UL — SIGNIFICANT CHANGE UP (ref 1.2–3.4)
MAGNESIUM SERPL-MCNC: 2.1 MG/DL — SIGNIFICANT CHANGE UP (ref 1.8–2.4)
MCHC RBC-ENTMCNC: 23 PG — LOW (ref 27–31)
MCHC RBC-ENTMCNC: 32.3 G/DL — SIGNIFICANT CHANGE UP (ref 32–37)
MCV RBC AUTO: 71 FL — LOW (ref 81–99)
MONOCYTES # BLD AUTO: 1.03 K/UL — HIGH (ref 0.1–0.6)
MONOCYTES NFR BLD AUTO: 7.4 % — SIGNIFICANT CHANGE UP (ref 1.7–9.3)
NEUTROPHILS # BLD AUTO: 9.26 K/UL — HIGH (ref 1.4–6.5)
NEUTROPHILS NFR BLD AUTO: 66.7 % — SIGNIFICANT CHANGE UP (ref 42.2–75.2)
NRBC # BLD: 0 /100 WBCS — SIGNIFICANT CHANGE UP (ref 0–0)
PLATELET # BLD AUTO: 344 K/UL — SIGNIFICANT CHANGE UP (ref 130–400)
POTASSIUM SERPL-MCNC: 4.2 MMOL/L — SIGNIFICANT CHANGE UP (ref 3.5–5)
POTASSIUM SERPL-SCNC: 4.2 MMOL/L — SIGNIFICANT CHANGE UP (ref 3.5–5)
PROT SERPL-MCNC: 6.2 G/DL — SIGNIFICANT CHANGE UP (ref 6–8)
RBC # BLD: 5.18 M/UL — SIGNIFICANT CHANGE UP (ref 4.2–5.4)
RBC # FLD: 16 % — HIGH (ref 11.5–14.5)
SODIUM SERPL-SCNC: 137 MMOL/L — SIGNIFICANT CHANGE UP (ref 135–146)
SPECIMEN SOURCE: SIGNIFICANT CHANGE UP
WBC # BLD: 13.89 K/UL — HIGH (ref 4.8–10.8)
WBC # FLD AUTO: 13.89 K/UL — HIGH (ref 4.8–10.8)

## 2022-12-17 PROCEDURE — 99239 HOSP IP/OBS DSCHRG MGMT >30: CPT

## 2022-12-17 PROCEDURE — 93306 TTE W/DOPPLER COMPLETE: CPT | Mod: 26

## 2022-12-17 RX ORDER — ALBUTEROL 90 UG/1
2 AEROSOL, METERED ORAL
Qty: 1 | Refills: 0
Start: 2022-12-17

## 2022-12-17 RX ORDER — BUDESONIDE AND FORMOTEROL FUMARATE DIHYDRATE 160; 4.5 UG/1; UG/1
2 AEROSOL RESPIRATORY (INHALATION)
Qty: 1 | Refills: 0
Start: 2022-12-17 | End: 2023-01-15

## 2022-12-17 RX ORDER — GUAIFENESIN/DEXTROMETHORPHAN 600MG-30MG
10 TABLET, EXTENDED RELEASE 12 HR ORAL
Qty: 237 | Refills: 0
Start: 2022-12-17 | End: 2022-12-30

## 2022-12-17 RX ADMIN — PANTOPRAZOLE SODIUM 40 MILLIGRAM(S): 20 TABLET, DELAYED RELEASE ORAL at 05:58

## 2022-12-17 RX ADMIN — Medication 200 MILLIGRAM(S): at 05:59

## 2022-12-17 RX ADMIN — AMLODIPINE BESYLATE 2.5 MILLIGRAM(S): 2.5 TABLET ORAL at 05:58

## 2022-12-17 RX ADMIN — Medication 81 MILLIGRAM(S): at 11:11

## 2022-12-17 RX ADMIN — Medication 200 MILLIGRAM(S): at 17:40

## 2022-12-17 RX ADMIN — AZITHROMYCIN 500 MILLIGRAM(S): 500 TABLET, FILM COATED ORAL at 11:11

## 2022-12-17 RX ADMIN — Medication 200 MILLIGRAM(S): at 11:11

## 2022-12-17 RX ADMIN — Medication 40 MILLIGRAM(S): at 05:58

## 2022-12-17 RX ADMIN — Medication 88 MICROGRAM(S): at 05:58

## 2022-12-17 RX ADMIN — Medication 200 MILLIGRAM(S): at 06:29

## 2022-12-17 NOTE — DISCHARGE NOTE PROVIDER - CARE PROVIDER_API CALL
Selvin Au)  Critical Care Medicine; Internal Medicine; Pulmonary Disease  [address] [place] [address]  Phone: [phone]  Fax: [phone]  Established Patient  Follow Up Time: 1 month    Flory Saenz  INTERNAL MEDICINE  [address] [place] [address]  Phone: [phone]  Fax: [phone]  Established Patient  Follow Up Time: 1 month

## 2022-12-17 NOTE — DISCHARGE NOTE NURSING/CASE MANAGEMENT/SOCIAL WORK - MODE OF TRANSPORTATION
Patient will increase AROM in right knee to WFL in order to perform toilet transfer independently within 3 weeks Wheelchair/Stroller

## 2022-12-17 NOTE — DISCHARGE NOTE PROVIDER - PROVIDER TOKENS
PROVIDER:[TOKEN:[[id number]],FOLLOWUP:[1 month],ESTABLISHEDPATIENT:[T]],PROVIDER:[TOKEN:[[id number]],FOLLOWUP:[1 month],ESTABLISHEDPATIENT:[T]]

## 2022-12-17 NOTE — DISCHARGE NOTE NURSING/CASE MANAGEMENT/SOCIAL WORK - NSDCPEFALRISK_GEN_ALL_CORE
For information on Fall & Injury Prevention, visit: [email or web address] OR  [email or web address] OR  https://www.cdc.gov/steadi/patient.html

## 2022-12-17 NOTE — DISCHARGE NOTE PROVIDER - HOSPITAL COURSE
SOB and non- productive cough likely secondary reactive airway. Pt reported that [facility] Pt has no hx of asthma. She is a non-smoker but her  is a smoker.   RVP, COVID, CTA, and CXR negative. Pt improved this admission with supportive care, nebulizers, and steroids. Pulmonology saw inpatient and recommended to check Aspergillus IgG and IgE. Please follow up on aspergillus results. She is on RA. She will be discharged on steroids, nebulizers, and should follow up with pulm outpatient for further work-up.     Pt started on ibuprofen for possible pericarditis (reports pleuritic chest pain). Echo showed ___.    Pt complained of right sided headache and vision changes. To r/o giant cell arthritis ESR and CRP were ____.     SOB and non- productive cough likely secondary reactive airway. Pt reported that she had flu like symptoms last month around [place]. Pt has no hx of asthma. She is a non-smoker but her  is a smoker.   RVP, COVID, CTA, and CXR negative. Pt improved this admission with supportive care, nebulizers, and steroids. Pulmonology saw inpatient and recommended to check Aspergillus IgG and IgE. Please follow up on aspergillus results. She is on RA. She will be discharged on steroids, nebulizers, and should follow up with pulm outpatient for further work-up.     Pt started on ibuprofen for possible pericarditis (reports pleuritic chest pain). Echo showed LVEF 67% and no pericardial effusions.     Pt complained of right sided headache and vision changes. To r/o giant cell arthritis CRP negative and ESR.      SOB and non- productive cough likely secondary reactive airway. Pt reported that [facility] Pt has no hx of asthma. She is a non-smoker but her  is a smoker.   RVP, COVID, CTA, and CXR negative. Pt improved this admission with supportive care, nebulizers, and steroids. Pulmonology saw inpatient and recommended to check Aspergillus IgG and IgE. Please follow up on aspergillus results. She is on RA. She will be discharged on steroids, nebulizers, and should follow up with pulm outpatient for further work-up.     Echo performed to r/o pericarditis because pt has pleuritic chest pain. Echo showed LVEF 67% and no pericardial effusions.     Pt complained of right sided headache and vision changes. To r/o giant cell arthritis CRP negative and ESR ___.      SOB and non- productive cough likely secondary reactive airway. Pt reported that [facility] Pt has no hx of asthma. She is a non-smoker but her  is a smoker.   RVP, COVID, CTA, and CXR negative. Pt improved this admission with supportive care, nebulizers, and steroids. Pulmonology saw inpatient and recommended to check Aspergillus IgG and IgE. Please follow up on aspergillus results. She is on RA. She will be discharged on a steroid taper (10-14 days per pulm), nebulizers, and should follow up with pulm outpatient for further work-up. May also need possible allergist evaluation OP.     Echo performed to r/o pericarditis because pt has pleuritic chest pain. Echo showed LVEF 67% and no pericardial effusions.     Pt complained of right sided headache and vision changes. CRP and ESR performed to rule out GCA and were negative.      SOB and non- productive cough likely secondary reactive airway. Pt reported that she had flu like symptoms last month around [facility]. Pt has no hx of asthma. She is a non-smoker but her  is a smoker.   RVP, COVID, CTA, and CXR negative. Pt improved this admission with supportive care, nebulizers, and steroids. Pulmonology saw inpatient and recommended to check Aspergillus IgG and IgE. Please follow up on aspergillus results. She is on RA. She will be discharged on a steroid taper (10-14 days per pulm), nebulizers, and should follow up with pulm outpatient for further work-up. May also need possible allergist evaluation OP.     Echo performed to r/o pericarditis because pt has pleuritic chest pain. Echo showed LVEF 67% and no pericardial effusions.     Pt complained of right sided headache and vision changes. CRP and ESR performed to rule out GCA and were negative.     Pt is pre-diabetic and fingersticks high this admission most likely secondary to steroid use. She should follow-up outpatient for continued monitoring,

## 2022-12-17 NOTE — DISCHARGE NOTE PROVIDER - NSDCMRMEDTOKEN_GEN_ALL_CORE_FT
amLODIPine 2.5 mg oral tablet: 1 tab(s) orally once a day  aspirin 81 mg oral delayed release tablet: 1 tab(s) orally once a day  atorvastatin 20 mg oral tablet: 1 tab(s) orally once a day  clonazePAM 0.5 mg oral tablet: 1 tab(s) orally once a day  guaifenesin-dextromethorphan 100 mg-10 mg/5 mL oral liquid: 10 milliliter(s) orally every 6 hours, As needed, Cough  metFORMIN 500 mg oral tablet: 1 tab(s) orally 2 times a day  Synthroid 88 mcg (0.088 mg) oral tablet: 1 tab(s) orally once a day   amLODIPine 2.5 mg oral tablet: 1 tab(s) orally once a day  aspirin 81 mg oral delayed release tablet: 1 tab(s) orally once a day  atorvastatin 20 mg oral tablet: 1 tab(s) orally once a day  budesonide-formoterol 160 mcg-4.5 mcg/inh inhalation aerosol: 2 puff(s) inhaled 2 times a day   clonazePAM 0.5 mg oral tablet: 1 tab(s) orally once a day  guaifenesin-dextromethorphan 100 mg-10 mg/5 mL oral liquid: 10 milliliter(s) orally every 6 hours, As needed, Cough  metFORMIN 500 mg oral tablet: 1 tab(s) orally 2 times a day  predniSONE 10 mg oral tablet: 1 tab(s) orally once a day x 16 days   Synthroid 88 mcg (0.088 mg) oral tablet: 1 tab(s) orally once a day   Albuterol (Eqv-ProAir HFA) 90 mcg/inh inhalation aerosol: 2 to 4 inhalations every 20 minutes for 3 doses; if good response, can lengthen interval to every 3 to 4 hours as needed  amLODIPine 2.5 mg oral tablet: 1 tab(s) orally once a day  aspirin 81 mg oral delayed release tablet: 1 tab(s) orally once a day  atorvastatin 20 mg oral tablet: 1 tab(s) orally once a day  budesonide-formoterol 160 mcg-4.5 mcg/inh inhalation aerosol: 2 puff(s) inhaled 2 times a day   clonazePAM 0.5 mg oral tablet: 1 tab(s) orally once a day  guaifenesin-dextromethorphan 100 mg-10 mg/5 mL oral liquid: 10 milliliter(s) orally every 6 hours, As needed, Cough  metFORMIN 500 mg oral tablet: 1 tab(s) orally 2 times a day  predniSONE 10 mg oral tablet: 4 tab(s) orally once a day x 3 days  3 tab(s) orally once a day x 3 days  2 tab(s) orally once a day x 3 days  1 tab(s) orally once a day x 3 days  Synthroid 88 mcg (0.088 mg) oral tablet: 1 tab(s) orally once a day

## 2022-12-17 NOTE — DISCHARGE NOTE NURSING/CASE MANAGEMENT/SOCIAL WORK - PATIENT PORTAL LINK FT
You can access the FollowMyHealth Patient Portal offered by [facility] by registering at the following website: [email or web address]. By joining [facility]’s FollowMyHealth portal, you will also be able to view your health information using other applications (apps) compatible with our system.

## 2022-12-17 NOTE — PROGRESS NOTE ADULT - SUBJECTIVE AND OBJECTIVE BOX
pt seen and examined    feels better and wants to go home         ROS: no cp, no sob, no n/v, no fever    Vital Signs Last 24 Hrs  T(C): 36.2 (17 Dec 2022 05:44), Max: 36.7 (16 Dec 2022 21:39)  T(F): 97.2 (17 Dec 2022 05:44), Max: 98 (16 Dec 2022 21:39)  HR: 68 (17 Dec 2022 05:44) (68 - 97)  BP: 125/84 (17 Dec 2022 05:44) (117/67 - 125/84)  BP(mean): --  RR: 18 (17 Dec 2022 05:44) (16 - 18)  SpO2: 96% (17 Dec 2022 05:44) (96% - 98%)    Parameters below as of 17 Dec 2022 05:44  Patient On (Oxygen Delivery Method): room air        physical exam  constitutional NAD, AAOX3, Respiratory  lungs CTA, CVS heart RRR, GI: abdomen Soft NT, ND, BS+, skin: intact  neuro exam Motor, sensory and CN normal, no deficit     MEDICATIONS  (STANDING):  albuterol/ipratropium for Nebulization 3 milliLiter(s) Nebulizer every 6 hours  amLODIPine   Tablet 2.5 milliGRAM(s) Oral daily  aspirin enteric coated 81 milliGRAM(s) Oral daily  atorvastatin 20 milliGRAM(s) Oral at bedtime  azithromycin   Tablet 500 milliGRAM(s) Oral daily  budesonide 160 MICROgram(s)/formoterol 4.5 MICROgram(s) Inhaler 2 Puff(s) Inhalation two times a day  dextrose 5%. 1000 milliLiter(s) (50 mL/Hr) IV Continuous <Continuous>  dextrose 5%. 1000 milliLiter(s) (100 mL/Hr) IV Continuous <Continuous>  dextrose 50% Injectable 25 Gram(s) IV Push once  dextrose 50% Injectable 12.5 Gram(s) IV Push once  dextrose 50% Injectable 25 Gram(s) IV Push once  enoxaparin Injectable 40 milliGRAM(s) SubCutaneous every 24 hours  glucagon  Injectable 1 milliGRAM(s) IntraMuscular once  ibuprofen  Tablet. 200 milliGRAM(s) Oral every 6 hours  insulin lispro (ADMELOG) corrective regimen sliding scale   SubCutaneous three times a day before meals  levothyroxine 88 MICROGram(s) Oral daily  methylPREDNISolone sodium succinate Injectable 40 milliGRAM(s) IV Push daily  pantoprazole    Tablet 40 milliGRAM(s) Oral before breakfast    MEDICATIONS  (PRN):  albuterol/ipratropium for Nebulization 3 milliLiter(s) Nebulizer every 4 hours PRN Shortness of Breath and/or Wheezing  dextrose Oral Gel 15 Gram(s) Oral once PRN Blood Glucose LESS THAN 70 milliGRAM(s)/deciliter  guaifenesin/dextromethorphan Oral Liquid 10 milliLiter(s) Oral every 6 hours PRN Cough    CAPILLARY BLOOD GLUCOSE      POCT Blood Glucose.: 305 mg/dL (17 Dec 2022 11:06)  POCT Blood Glucose.: 146 mg/dL (16 Dec 2022 21:14)  POCT Blood Glucose.: 167 mg/dL (16 Dec 2022 17:46)                          11.9   13.89 )-----------( 344      ( 17 Dec 2022 05:26 )             36.8     12-17    137  |  103  |  13  ----------------------------<  90  4.2   |  25  |  0.6<L>    Ca    8.4      17 Dec 2022 05:26  Mg     2.1     12-17    TPro  6.2  /  Alb  3.9  /  TBili  0.3  /  DBili  x   /  AST  9   /  ALT  14  /  AlkPhos  90  12-17    D-Dimer Assay, Quantitative: 514 ng/mL DDU (12-14-22 @ 13:43)    < from: CT Angio Chest PE Protocol w/ IV Cont (12.14.22 @ 15:48) >  1.  No evidence of acute pulmonary embolus or acute thoracic pathology.    < end of copied text >    a/p    42 year old patient, known to have HTN, HLD, pre DM, hypothyroidism and history of panic attacks, presented to ED with SOB of 5 days duration.   History goes back to Saturday when patient started having SOB, chest tightness and non-productive cough. She went to her PCP and was prescribed Albuterol (used 3 inhalers so far). Her symptoms did not improve so she went to her PCP again, her saturation was 85% on RA so she was placed on O2, given 125 mcg Solumedrol, Magnesium 2 g and Duonebs and was sent to the ED.   She denies fever, chills, chest pain, URT, abdominal or urinary symptoms. She never had a similar episode before. She was never diagnosed with Asthma.   No seasonal allergies, no eczema, no food allergies. No family history of asthma.     today he is not sob,   ct neg for PE  today she is complaining of headache, with some blurry vision, but the symptoms are on and off.     no n/v     # headache, with visual changes,   check esr, crp if very high will give high dose steroids, and refer to vascular for temporal art bx ( rule out temporal arteritis )   if esr and crp wnl then dc home and conservative management      # possible hyperactive airway disease,   low suspected pericarditis,   fu echo     # # sob, hyperactive airway disease,   Flu With COVID-19 By PAIGE (12.14.22 @ 13:43)    SARS-CoV-2 Result: NotDetec    Influenza A Result: NotDetec    Influenza B Result: NotDetec    Resp Syn Virus Result: NotDetec: The [employer] m Resp-4-Plex assay is a multiplex real-time reverse    cont current management     check Aspergillus IgG, IgE      #Progress Note Handoff  Pending (specify): ESR, CRP, Echo report   Family discussion: dw pt   Disposition: Home_   time spent 35 min

## 2022-12-17 NOTE — DISCHARGE NOTE PROVIDER - NSDCCPCAREPLAN_GEN_ALL_CORE_FT
PRINCIPAL DISCHARGE DIAGNOSIS  Diagnosis: Exacerbation of RAD (reactive airway disease)  Assessment and Plan of Treatment: You were seen by pulmonology here for shortness of breath most likely due to reactive airway disease following a virus. You improved on nebulizers and steroids this admission. You should follow up with pulmonology outpatient and your PMD for continued monitoring.

## 2022-12-20 LAB
A FLAVUS AB FLD QL: NEGATIVE — SIGNIFICANT CHANGE UP
A NIGER AB FLD QL: NEGATIVE — SIGNIFICANT CHANGE UP
A NIGER AB FLD QL: NEGATIVE — SIGNIFICANT CHANGE UP

## 2022-12-23 DIAGNOSIS — I10 ESSENTIAL (PRIMARY) HYPERTENSION: ICD-10-CM

## 2022-12-23 DIAGNOSIS — D72.19 OTHER EOSINOPHILIA: ICD-10-CM

## 2022-12-23 DIAGNOSIS — Z79.82 LONG TERM (CURRENT) USE OF ASPIRIN: ICD-10-CM

## 2022-12-23 DIAGNOSIS — R73.03 PREDIABETES: ICD-10-CM

## 2022-12-23 DIAGNOSIS — Z79.84 LONG TERM (CURRENT) USE OF ORAL HYPOGLYCEMIC DRUGS: ICD-10-CM

## 2022-12-23 DIAGNOSIS — J45.901 UNSPECIFIED ASTHMA WITH (ACUTE) EXACERBATION: ICD-10-CM

## 2022-12-23 DIAGNOSIS — E78.5 HYPERLIPIDEMIA, UNSPECIFIED: ICD-10-CM

## 2022-12-23 DIAGNOSIS — Z20.822 CONTACT WITH AND (SUSPECTED) EXPOSURE TO COVID-19: ICD-10-CM

## 2022-12-23 DIAGNOSIS — Z77.22 CONTACT WITH AND (SUSPECTED) EXPOSURE TO ENVIRONMENTAL TOBACCO SMOKE (ACUTE) (CHRONIC): ICD-10-CM

## 2022-12-23 DIAGNOSIS — E03.9 HYPOTHYROIDISM, UNSPECIFIED: ICD-10-CM

## 2023-01-12 ENCOUNTER — INPATIENT (INPATIENT)
Facility: HOSPITAL | Age: 43
LOS: 2 days | Discharge: HOME | End: 2023-01-15
Attending: INTERNAL MEDICINE | Admitting: INTERNAL MEDICINE
Payer: MEDICAID

## 2023-01-12 VITALS
TEMPERATURE: 99 F | WEIGHT: 169.98 LBS | OXYGEN SATURATION: 96 % | HEIGHT: 62 IN | RESPIRATION RATE: 24 BRPM | DIASTOLIC BLOOD PRESSURE: 95 MMHG | HEART RATE: 76 BPM | SYSTOLIC BLOOD PRESSURE: 145 MMHG

## 2023-01-12 LAB
ALBUMIN SERPL ELPH-MCNC: 4.6 G/DL — SIGNIFICANT CHANGE UP (ref 3.5–5.2)
ALP SERPL-CCNC: 96 U/L — SIGNIFICANT CHANGE UP (ref 30–115)
ALT FLD-CCNC: 20 U/L — SIGNIFICANT CHANGE UP (ref 0–41)
ANION GAP SERPL CALC-SCNC: 9 MMOL/L — SIGNIFICANT CHANGE UP (ref 7–14)
ANISOCYTOSIS BLD QL: SLIGHT — SIGNIFICANT CHANGE UP
AST SERPL-CCNC: 16 U/L — SIGNIFICANT CHANGE UP (ref 0–41)
BASE EXCESS BLDV CALC-SCNC: -2 MMOL/L — SIGNIFICANT CHANGE UP (ref -2–3)
BASOPHILS # BLD AUTO: 0.09 K/UL — SIGNIFICANT CHANGE UP (ref 0–0.2)
BASOPHILS NFR BLD AUTO: 0.9 % — SIGNIFICANT CHANGE UP (ref 0–1)
BILIRUB SERPL-MCNC: 0.3 MG/DL — SIGNIFICANT CHANGE UP (ref 0.2–1.2)
BUN SERPL-MCNC: 8 MG/DL — LOW (ref 10–20)
CA-I SERPL-SCNC: 1.23 MMOL/L — SIGNIFICANT CHANGE UP (ref 1.15–1.33)
CALCIUM SERPL-MCNC: 9.5 MG/DL — SIGNIFICANT CHANGE UP (ref 8.4–10.4)
CHLORIDE SERPL-SCNC: 104 MMOL/L — SIGNIFICANT CHANGE UP (ref 98–110)
CO2 BLDV-SCNC: 23.4 MMOL/L — SIGNIFICANT CHANGE UP (ref 22–26)
CO2 SERPL-SCNC: 24 MMOL/L — SIGNIFICANT CHANGE UP (ref 17–32)
CREAT SERPL-MCNC: 0.6 MG/DL — LOW (ref 0.7–1.5)
EGFR: 114 ML/MIN/1.73M2 — SIGNIFICANT CHANGE UP
EOSINOPHIL # BLD AUTO: 1.92 K/UL — HIGH (ref 0–0.7)
EOSINOPHIL NFR BLD AUTO: 18.4 % — HIGH (ref 0–8)
FLUAV AG NPH QL: SIGNIFICANT CHANGE UP
FLUBV AG NPH QL: SIGNIFICANT CHANGE UP
GAS PNL BLDV: 135 MMOL/L — LOW (ref 136–145)
GAS PNL BLDV: SIGNIFICANT CHANGE UP
GAS PNL BLDV: SIGNIFICANT CHANGE UP
GIANT PLATELETS BLD QL SMEAR: PRESENT — SIGNIFICANT CHANGE UP
GLUCOSE SERPL-MCNC: 101 MG/DL — HIGH (ref 70–99)
HCG SERPL QL: NEGATIVE — SIGNIFICANT CHANGE UP
HCO3 BLDV-SCNC: 22 MMOL/L — SIGNIFICANT CHANGE UP (ref 22–29)
HCT VFR BLD CALC: 41.3 % — SIGNIFICANT CHANGE UP (ref 37–47)
HCT VFR BLDA CALC: 39 % — SIGNIFICANT CHANGE UP (ref 39–51)
HGB BLD CALC-MCNC: 13 G/DL — SIGNIFICANT CHANGE UP (ref 12.6–17.4)
HGB BLD-MCNC: 13.3 G/DL — SIGNIFICANT CHANGE UP (ref 12–16)
LACTATE BLDV-MCNC: 1.1 MMOL/L — SIGNIFICANT CHANGE UP (ref 0.5–2)
LYMPHOCYTES # BLD AUTO: 2.75 K/UL — SIGNIFICANT CHANGE UP (ref 1.2–3.4)
LYMPHOCYTES # BLD AUTO: 26.3 % — SIGNIFICANT CHANGE UP (ref 20.5–51.1)
MANUAL SMEAR VERIFICATION: SIGNIFICANT CHANGE UP
MCHC RBC-ENTMCNC: 23.6 PG — LOW (ref 27–31)
MCHC RBC-ENTMCNC: 32.2 G/DL — SIGNIFICANT CHANGE UP (ref 32–37)
MCV RBC AUTO: 73.2 FL — LOW (ref 81–99)
MICROCYTES BLD QL: SLIGHT — SIGNIFICANT CHANGE UP
MONOCYTES # BLD AUTO: 0.37 K/UL — SIGNIFICANT CHANGE UP (ref 0.1–0.6)
MONOCYTES NFR BLD AUTO: 3.5 % — SIGNIFICANT CHANGE UP (ref 1.7–9.3)
NEUTROPHILS # BLD AUTO: 5.23 K/UL — SIGNIFICANT CHANGE UP (ref 1.4–6.5)
NEUTROPHILS NFR BLD AUTO: 50 % — SIGNIFICANT CHANGE UP (ref 42.2–75.2)
NT-PROBNP SERPL-SCNC: <5 PG/ML — SIGNIFICANT CHANGE UP (ref 0–300)
OVALOCYTES BLD QL SMEAR: SLIGHT — SIGNIFICANT CHANGE UP
PCO2 BLDV: 36 MMHG — LOW (ref 39–42)
PH BLDV: 7.4 — SIGNIFICANT CHANGE UP (ref 7.32–7.43)
PLAT MORPH BLD: NORMAL — SIGNIFICANT CHANGE UP
PLATELET # BLD AUTO: 344 K/UL — SIGNIFICANT CHANGE UP (ref 130–400)
PO2 BLDV: 61 MMHG — SIGNIFICANT CHANGE UP
POIKILOCYTOSIS BLD QL AUTO: SLIGHT — SIGNIFICANT CHANGE UP
POLYCHROMASIA BLD QL SMEAR: SLIGHT — SIGNIFICANT CHANGE UP
POTASSIUM BLDV-SCNC: 3.7 MMOL/L — SIGNIFICANT CHANGE UP (ref 3.5–5.1)
POTASSIUM SERPL-MCNC: 4.1 MMOL/L — SIGNIFICANT CHANGE UP (ref 3.5–5)
POTASSIUM SERPL-SCNC: 4.1 MMOL/L — SIGNIFICANT CHANGE UP (ref 3.5–5)
PROT SERPL-MCNC: 7.1 G/DL — SIGNIFICANT CHANGE UP (ref 6–8)
RBC # BLD: 5.64 M/UL — HIGH (ref 4.2–5.4)
RBC # FLD: 18.3 % — HIGH (ref 11.5–14.5)
RBC BLD AUTO: ABNORMAL
RSV RNA NPH QL NAA+NON-PROBE: SIGNIFICANT CHANGE UP
SAO2 % BLDV: 89.9 % — SIGNIFICANT CHANGE UP
SARS-COV-2 RNA SPEC QL NAA+PROBE: SIGNIFICANT CHANGE UP
SMUDGE CELLS # BLD: PRESENT — SIGNIFICANT CHANGE UP
SODIUM SERPL-SCNC: 137 MMOL/L — SIGNIFICANT CHANGE UP (ref 135–146)
TROPONIN T SERPL-MCNC: <0.01 NG/ML — SIGNIFICANT CHANGE UP
VARIANT LYMPHS # BLD: 0.9 % — SIGNIFICANT CHANGE UP (ref 0–5)
WBC # BLD: 10.46 K/UL — SIGNIFICANT CHANGE UP (ref 4.8–10.8)
WBC # FLD AUTO: 10.46 K/UL — SIGNIFICANT CHANGE UP (ref 4.8–10.8)

## 2023-01-12 PROCEDURE — 93010 ELECTROCARDIOGRAM REPORT: CPT

## 2023-01-12 PROCEDURE — 71045 X-RAY EXAM CHEST 1 VIEW: CPT | Mod: 26

## 2023-01-12 PROCEDURE — 99285 EMERGENCY DEPT VISIT HI MDM: CPT

## 2023-01-12 RX ORDER — IPRATROPIUM/ALBUTEROL SULFATE 18-103MCG
3 AEROSOL WITH ADAPTER (GRAM) INHALATION
Refills: 0 | Status: COMPLETED | OUTPATIENT
Start: 2023-01-12 | End: 2023-01-12

## 2023-01-12 RX ORDER — MAGNESIUM SULFATE 500 MG/ML
2 VIAL (ML) INJECTION ONCE
Refills: 0 | Status: COMPLETED | OUTPATIENT
Start: 2023-01-12 | End: 2023-01-12

## 2023-01-12 RX ORDER — ALBUTEROL 90 UG/1
2.5 AEROSOL, METERED ORAL ONCE
Refills: 0 | Status: COMPLETED | OUTPATIENT
Start: 2023-01-12 | End: 2023-01-12

## 2023-01-12 RX ADMIN — Medication 100 MILLIGRAM(S): at 22:32

## 2023-01-12 RX ADMIN — Medication 125 MILLIGRAM(S): at 21:54

## 2023-01-12 RX ADMIN — Medication 3 MILLILITER(S): at 21:54

## 2023-01-12 RX ADMIN — Medication 3 MILLILITER(S): at 22:06

## 2023-01-12 RX ADMIN — Medication 100 GRAM(S): at 23:34

## 2023-01-12 RX ADMIN — Medication 3 MILLILITER(S): at 22:05

## 2023-01-12 NOTE — ED PROVIDER NOTE - CLINICAL SUMMARY MEDICAL DECISION MAKING FREE TEXT BOX
Laboratory results reviewed and discussed with patient. CBC shows normal WBC count, Hb/Hct and plateelet count are WNL. BMP shows electrolytes WNL and no ROMMEL.   Troponin is negative and ProBNP is WNL  EKG reviewed by me shows NSR at rate of 97, MD/QRS/QTC intervals are WNL, no ST elevations/depressions noted.   CXR reviewed me and shows: No focal consolidation, signs of opacities or edema, pneumothorax, free air or evidence of trauma on my interpretation.  Patient remained hemodynamically stable during the course of ED stay. Discussed with patient about the results of the diagnostic studies. Discussed with admitting physician and MAR, patient is admitted to Medicine for further evaluation and care.

## 2023-01-12 NOTE — ED PROVIDER NOTE - DIFFERENTIAL DIAGNOSIS
CHF,   Asthma exacerbation,   pneumonia,   pulmonary embolism less likely,  electrolyte abnormalities.  cardiac arrhythmia,   cardiac ischemia. Differential Diagnosis

## 2023-01-12 NOTE — ED PROVIDER NOTE - ATTENDING CONTRIBUTION TO CARE
Patient is c/o sob/wheezing/chest tightness with cough x one week. Patient was admitted in month of December 22 with similar symptoms, had improved after the discharge, but symptoms returned back again. Denies f/c/travel.   Vitals reviewed.  Lungs: B/L decreased air entry, B/L wheezing, no crackles.   Abd: +BS, NT, ND, soft, no rebound, no guarding. No CVA tenderness, no rash.  Ext: B/L lower ext appears symmetrical, no swelling, no redness, no crepitus, no pain on ROM of the joints, no deformity, and are distally NVI.  A/P: Dyspnea/wheezing,   Acute bronchitis,   r/o pneumonia,   labs, nebs, EKG, CXR,   reevaluation.

## 2023-01-12 NOTE — ED ADULT TRIAGE NOTE - IDEAL BODY WEIGHT(KG)
Telephone Encounter by Kasey Richards CMA at 05/08/17 10:29 AM     Author:  Kasey Richards CMA Service:  (none) Author Type:  Certified Medical Assistant     Filed:  05/08/17 10:30 AM Encounter Date:  5/8/2017 Status:  Signed     :  Kasey Richards CMA (Certified Medical Assistant)            Routed to Dr. Rascon, patient has a standing order for BMP (non fasting).  Did you want me to add any other additional labs?[RM1.1M]        Revision History        User Key Date/Time User Provider Type Action    > RM1.1 05/08/17 10:30 AM Kasey Richards CMA Certified Medical Assistant Sign    M - Manual             50

## 2023-01-12 NOTE — ED PROVIDER NOTE - PROGRESS NOTE DETAILS
PS: Labs and CXR unremarkable. Pt still wheezing, reports chronic cough. Will admit for further workup

## 2023-01-12 NOTE — ED PROVIDER NOTE - OBJECTIVE STATEMENT
Pt is a 41 y/o female with PMH of HTN, HLD, hypothyroidism, prediabetes and recent admission for chronic cough/ reactive airway (discharged on 12/17) presenting for cough x 1 week. Pt reports she has had chronic dry cough x 2 years that worsened again the last week. On last admission, she was told likely reactive airway/asthma and improved with nebs and steroids. Aspergillus workup was negative. Says she has appointment with pulm in February but cannot wait that long. Endorses chest pain when she coughs and has SOB. No fever, congestion or sore throat. Pt nonsmoker but  smokes.

## 2023-01-12 NOTE — ED ADULT TRIAGE NOTE - CHIEF COMPLAINT QUOTE
Pt c/o non-productive cough and shortness of breath x 1 month. Pt states she is using albuterol pump multiple times a day with no relief.

## 2023-01-12 NOTE — ED ADULT NURSE NOTE - OBJECTIVE STATEMENT
Pt alert and oriented x3. Airway patent with equal respirations. No distress noted. Arom x 4 extremitites. c.o cought x 1 month

## 2023-01-12 NOTE — ED PROVIDER NOTE - PHYSICAL EXAMINATION
CONST: well appearing for age  HEAD:  normocephalic, atraumatic  EYES:  conjunctivae without injection, drainage or discharge  ENMT: nasal mucosa moist; mouth moist without ulcerations or lesions; throat moist without erythema, exudate, ulcerations or lesions  NECK:  supple  CARDIAC:  regular rate and rhythm, normal S1 and S2, no murmurs, rubs or gallops  RESP:  respiratory rate and effort appear normal for age; intermittent wheezing bilaterally  ABDOMEN:  soft, nontender, nondistended  MUSCULOSKELETAL/NEURO:  AAOx3, normal gait, normal movement, normal tone  SKIN:  normal skin color for age and race, well-perfused; warm and dry

## 2023-01-13 LAB
ALBUMIN SERPL ELPH-MCNC: 4.6 G/DL — SIGNIFICANT CHANGE UP (ref 3.5–5.2)
ALP SERPL-CCNC: 92 U/L — SIGNIFICANT CHANGE UP (ref 30–115)
ALT FLD-CCNC: 19 U/L — SIGNIFICANT CHANGE UP (ref 0–41)
ANION GAP SERPL CALC-SCNC: 12 MMOL/L — SIGNIFICANT CHANGE UP (ref 7–14)
AST SERPL-CCNC: 13 U/L — SIGNIFICANT CHANGE UP (ref 0–41)
BASOPHILS # BLD AUTO: 0.01 K/UL — SIGNIFICANT CHANGE UP (ref 0–0.2)
BASOPHILS # BLD AUTO: 0.02 K/UL — SIGNIFICANT CHANGE UP (ref 0–0.2)
BASOPHILS NFR BLD AUTO: 0.1 % — SIGNIFICANT CHANGE UP (ref 0–1)
BASOPHILS NFR BLD AUTO: 0.2 % — SIGNIFICANT CHANGE UP (ref 0–1)
BILIRUB SERPL-MCNC: 0.3 MG/DL — SIGNIFICANT CHANGE UP (ref 0.2–1.2)
BUN SERPL-MCNC: 12 MG/DL — SIGNIFICANT CHANGE UP (ref 10–20)
CALCIUM SERPL-MCNC: 9.2 MG/DL — SIGNIFICANT CHANGE UP (ref 8.4–10.4)
CHLORIDE SERPL-SCNC: 103 MMOL/L — SIGNIFICANT CHANGE UP (ref 98–110)
CHOLEST SERPL-MCNC: 169 MG/DL — SIGNIFICANT CHANGE UP
CO2 SERPL-SCNC: 20 MMOL/L — SIGNIFICANT CHANGE UP (ref 17–32)
CREAT SERPL-MCNC: 0.8 MG/DL — SIGNIFICANT CHANGE UP (ref 0.7–1.5)
EGFR: 94 ML/MIN/1.73M2 — SIGNIFICANT CHANGE UP
EOSINOPHIL # BLD AUTO: 0.01 K/UL — SIGNIFICANT CHANGE UP (ref 0–0.7)
EOSINOPHIL # BLD AUTO: 0.01 K/UL — SIGNIFICANT CHANGE UP (ref 0–0.7)
EOSINOPHIL NFR BLD AUTO: 0.1 % — SIGNIFICANT CHANGE UP (ref 0–8)
EOSINOPHIL NFR BLD AUTO: 0.1 % — SIGNIFICANT CHANGE UP (ref 0–8)
GLUCOSE BLDC GLUCOMTR-MCNC: 187 MG/DL — HIGH (ref 70–99)
GLUCOSE SERPL-MCNC: 176 MG/DL — HIGH (ref 70–99)
HCT VFR BLD CALC: 38.8 % — SIGNIFICANT CHANGE UP (ref 37–47)
HCT VFR BLD CALC: 39 % — SIGNIFICANT CHANGE UP (ref 37–47)
HDLC SERPL-MCNC: 71 MG/DL — SIGNIFICANT CHANGE UP
HGB BLD-MCNC: 12.5 G/DL — SIGNIFICANT CHANGE UP (ref 12–16)
HGB BLD-MCNC: 12.6 G/DL — SIGNIFICANT CHANGE UP (ref 12–16)
IMM GRANULOCYTES NFR BLD AUTO: 0.5 % — HIGH (ref 0.1–0.3)
IMM GRANULOCYTES NFR BLD AUTO: 0.5 % — HIGH (ref 0.1–0.3)
LIPID PNL WITH DIRECT LDL SERPL: 87 MG/DL — SIGNIFICANT CHANGE UP
LYMPHOCYTES # BLD AUTO: 1.51 K/UL — SIGNIFICANT CHANGE UP (ref 1.2–3.4)
LYMPHOCYTES # BLD AUTO: 1.6 K/UL — SIGNIFICANT CHANGE UP (ref 1.2–3.4)
LYMPHOCYTES # BLD AUTO: 11.6 % — LOW (ref 20.5–51.1)
LYMPHOCYTES # BLD AUTO: 12.4 % — LOW (ref 20.5–51.1)
MAGNESIUM SERPL-MCNC: 2.1 MG/DL — SIGNIFICANT CHANGE UP (ref 1.8–2.4)
MCHC RBC-ENTMCNC: 23.6 PG — LOW (ref 27–31)
MCHC RBC-ENTMCNC: 23.6 PG — LOW (ref 27–31)
MCHC RBC-ENTMCNC: 32.2 G/DL — SIGNIFICANT CHANGE UP (ref 32–37)
MCHC RBC-ENTMCNC: 32.3 G/DL — SIGNIFICANT CHANGE UP (ref 32–37)
MCV RBC AUTO: 73.2 FL — LOW (ref 81–99)
MCV RBC AUTO: 73.2 FL — LOW (ref 81–99)
MONOCYTES # BLD AUTO: 0.82 K/UL — HIGH (ref 0.1–0.6)
MONOCYTES # BLD AUTO: 0.91 K/UL — HIGH (ref 0.1–0.6)
MONOCYTES NFR BLD AUTO: 6.3 % — SIGNIFICANT CHANGE UP (ref 1.7–9.3)
MONOCYTES NFR BLD AUTO: 7 % — SIGNIFICANT CHANGE UP (ref 1.7–9.3)
NEUTROPHILS # BLD AUTO: 10.44 K/UL — HIGH (ref 1.4–6.5)
NEUTROPHILS # BLD AUTO: 10.55 K/UL — HIGH (ref 1.4–6.5)
NEUTROPHILS NFR BLD AUTO: 80.6 % — HIGH (ref 42.2–75.2)
NEUTROPHILS NFR BLD AUTO: 80.6 % — HIGH (ref 42.2–75.2)
NON HDL CHOLESTEROL: 98 MG/DL — SIGNIFICANT CHANGE UP
NRBC # BLD: 0 /100 WBCS — SIGNIFICANT CHANGE UP (ref 0–0)
NRBC # BLD: 0 /100 WBCS — SIGNIFICANT CHANGE UP (ref 0–0)
PLATELET # BLD AUTO: 355 K/UL — SIGNIFICANT CHANGE UP (ref 130–400)
PLATELET # BLD AUTO: 358 K/UL — SIGNIFICANT CHANGE UP (ref 130–400)
POTASSIUM SERPL-MCNC: 4.2 MMOL/L — SIGNIFICANT CHANGE UP (ref 3.5–5)
POTASSIUM SERPL-SCNC: 4.2 MMOL/L — SIGNIFICANT CHANGE UP (ref 3.5–5)
PROT SERPL-MCNC: 7.2 G/DL — SIGNIFICANT CHANGE UP (ref 6–8)
RBC # BLD: 5.3 M/UL — SIGNIFICANT CHANGE UP (ref 4.2–5.4)
RBC # BLD: 5.33 M/UL — SIGNIFICANT CHANGE UP (ref 4.2–5.4)
RBC # FLD: 18.1 % — HIGH (ref 11.5–14.5)
RBC # FLD: 18.2 % — HIGH (ref 11.5–14.5)
SODIUM SERPL-SCNC: 135 MMOL/L — SIGNIFICANT CHANGE UP (ref 135–146)
TRIGL SERPL-MCNC: 53 MG/DL — SIGNIFICANT CHANGE UP
WBC # BLD: 12.95 K/UL — HIGH (ref 4.8–10.8)
WBC # BLD: 13.07 K/UL — HIGH (ref 4.8–10.8)
WBC # FLD AUTO: 12.95 K/UL — HIGH (ref 4.8–10.8)
WBC # FLD AUTO: 13.07 K/UL — HIGH (ref 4.8–10.8)

## 2023-01-13 PROCEDURE — 99253 IP/OBS CNSLTJ NEW/EST LOW 45: CPT | Mod: GC

## 2023-01-13 PROCEDURE — 99221 1ST HOSP IP/OBS SF/LOW 40: CPT

## 2023-01-13 PROCEDURE — 99223 1ST HOSP IP/OBS HIGH 75: CPT | Mod: GC

## 2023-01-13 PROCEDURE — 99222 1ST HOSP IP/OBS MODERATE 55: CPT | Mod: GC

## 2023-01-13 RX ORDER — CHLORHEXIDINE GLUCONATE 213 G/1000ML
1 SOLUTION TOPICAL
Refills: 0 | Status: DISCONTINUED | OUTPATIENT
Start: 2023-01-13 | End: 2023-01-15

## 2023-01-13 RX ORDER — IPRATROPIUM/ALBUTEROL SULFATE 18-103MCG
3 AEROSOL WITH ADAPTER (GRAM) INHALATION EVERY 6 HOURS
Refills: 0 | Status: DISCONTINUED | OUTPATIENT
Start: 2023-01-13 | End: 2023-01-15

## 2023-01-13 RX ORDER — ENOXAPARIN SODIUM 100 MG/ML
40 INJECTION SUBCUTANEOUS EVERY 24 HOURS
Refills: 0 | Status: DISCONTINUED | OUTPATIENT
Start: 2023-01-13 | End: 2023-01-15

## 2023-01-13 RX ORDER — GUAIFENESIN/DEXTROMETHORPHAN 600MG-30MG
10 TABLET, EXTENDED RELEASE 12 HR ORAL EVERY 4 HOURS
Refills: 0 | Status: DISCONTINUED | OUTPATIENT
Start: 2023-01-13 | End: 2023-01-15

## 2023-01-13 RX ORDER — LEVOTHYROXINE SODIUM 125 MCG
1 TABLET ORAL
Qty: 0 | Refills: 0 | DISCHARGE

## 2023-01-13 RX ORDER — LEVOTHYROXINE SODIUM 125 MCG
100 TABLET ORAL DAILY
Refills: 0 | Status: DISCONTINUED | OUTPATIENT
Start: 2023-01-13 | End: 2023-01-13

## 2023-01-13 RX ORDER — ACETAMINOPHEN 500 MG
650 TABLET ORAL EVERY 6 HOURS
Refills: 0 | Status: DISCONTINUED | OUTPATIENT
Start: 2023-01-13 | End: 2023-01-15

## 2023-01-13 RX ORDER — BUDESONIDE AND FORMOTEROL FUMARATE DIHYDRATE 160; 4.5 UG/1; UG/1
2 AEROSOL RESPIRATORY (INHALATION)
Refills: 0 | Status: DISCONTINUED | OUTPATIENT
Start: 2023-01-13 | End: 2023-01-13

## 2023-01-13 RX ORDER — SERTRALINE 25 MG/1
25 TABLET, FILM COATED ORAL DAILY
Refills: 0 | Status: DISCONTINUED | OUTPATIENT
Start: 2023-01-13 | End: 2023-01-13

## 2023-01-13 RX ORDER — CLONAZEPAM 1 MG
1 TABLET ORAL
Qty: 0 | Refills: 0 | DISCHARGE

## 2023-01-13 RX ORDER — ATORVASTATIN CALCIUM 80 MG/1
20 TABLET, FILM COATED ORAL AT BEDTIME
Refills: 0 | Status: DISCONTINUED | OUTPATIENT
Start: 2023-01-13 | End: 2023-01-15

## 2023-01-13 RX ORDER — LISINOPRIL 2.5 MG/1
10 TABLET ORAL DAILY
Refills: 0 | Status: DISCONTINUED | OUTPATIENT
Start: 2023-01-13 | End: 2023-01-13

## 2023-01-13 RX ORDER — ALBUTEROL 90 UG/1
2 AEROSOL, METERED ORAL EVERY 6 HOURS
Refills: 0 | Status: DISCONTINUED | OUTPATIENT
Start: 2023-01-13 | End: 2023-01-15

## 2023-01-13 RX ORDER — LANOLIN ALCOHOL/MO/W.PET/CERES
5 CREAM (GRAM) TOPICAL AT BEDTIME
Refills: 0 | Status: DISCONTINUED | OUTPATIENT
Start: 2023-01-13 | End: 2023-01-15

## 2023-01-13 RX ORDER — ASPIRIN/CALCIUM CARB/MAGNESIUM 324 MG
1 TABLET ORAL
Qty: 0 | Refills: 0 | DISCHARGE

## 2023-01-13 RX ORDER — LOSARTAN POTASSIUM 100 MG/1
50 TABLET, FILM COATED ORAL DAILY
Refills: 0 | Status: DISCONTINUED | OUTPATIENT
Start: 2023-01-13 | End: 2023-01-15

## 2023-01-13 RX ORDER — BUDESONIDE AND FORMOTEROL FUMARATE DIHYDRATE 160; 4.5 UG/1; UG/1
2 AEROSOL RESPIRATORY (INHALATION)
Refills: 0 | Status: DISCONTINUED | OUTPATIENT
Start: 2023-01-13 | End: 2023-01-15

## 2023-01-13 RX ORDER — AMLODIPINE BESYLATE 2.5 MG/1
1 TABLET ORAL
Qty: 0 | Refills: 0 | DISCHARGE

## 2023-01-13 RX ORDER — LEVOTHYROXINE SODIUM 125 MCG
88 TABLET ORAL DAILY
Refills: 0 | Status: DISCONTINUED | OUTPATIENT
Start: 2023-01-13 | End: 2023-01-15

## 2023-01-13 RX ADMIN — Medication 10 MILLILITER(S): at 05:44

## 2023-01-13 RX ADMIN — Medication 10 MILLILITER(S): at 21:19

## 2023-01-13 RX ADMIN — Medication 3 MILLILITER(S): at 20:56

## 2023-01-13 RX ADMIN — ATORVASTATIN CALCIUM 20 MILLIGRAM(S): 80 TABLET, FILM COATED ORAL at 21:18

## 2023-01-13 RX ADMIN — ALBUTEROL 2.5 MILLIGRAM(S): 90 AEROSOL, METERED ORAL at 00:02

## 2023-01-13 RX ADMIN — Medication 10 MILLILITER(S): at 15:21

## 2023-01-13 RX ADMIN — Medication 40 MILLIGRAM(S): at 09:32

## 2023-01-13 RX ADMIN — Medication 88 MICROGRAM(S): at 05:45

## 2023-01-13 RX ADMIN — CHLORHEXIDINE GLUCONATE 1 APPLICATION(S): 213 SOLUTION TOPICAL at 05:47

## 2023-01-13 RX ADMIN — BUDESONIDE AND FORMOTEROL FUMARATE DIHYDRATE 2 PUFF(S): 160; 4.5 AEROSOL RESPIRATORY (INHALATION) at 09:44

## 2023-01-13 RX ADMIN — ENOXAPARIN SODIUM 40 MILLIGRAM(S): 100 INJECTION SUBCUTANEOUS at 17:24

## 2023-01-13 RX ADMIN — Medication 10 MILLILITER(S): at 17:24

## 2023-01-13 RX ADMIN — LOSARTAN POTASSIUM 50 MILLIGRAM(S): 100 TABLET, FILM COATED ORAL at 05:49

## 2023-01-13 RX ADMIN — Medication 3 MILLILITER(S): at 14:20

## 2023-01-13 RX ADMIN — Medication 3 MILLILITER(S): at 08:09

## 2023-01-13 RX ADMIN — Medication 10 MILLILITER(S): at 09:31

## 2023-01-13 NOTE — H&P ADULT - HISTORY OF PRESENT ILLNESS
41 yo F w h/o htn, hld, pre-dm, hypothyroid presents for persistent nonproductive cough and sob. Patient states that this cough has been intermittent over the last two years and most recently flared up this past saturday (1/7), and has been accompanied by difficulty bretaing. Patient was most recently admitted 12/2022 for similar presentation. Patient denies smoking or ever being diagnosed with asthma or copd, but confirms  smokes. Of note, patient apparently has a scheduled pulm appointment next month. Also to note, patient endorses snoring since the birth of her children. Patient with no other acute complaints; ros neg.     In ED, T 98.7, /95, HR 76. RR 24, SpO2 96% on RA; labs unremarkable; CXR neg    Patient given steroids, magnesium and nebulizer and admitted for further evaluation. Comment: recent flare of HS along external vulva and right inframammary areas.  I suggested she add seysara.  She has had frequent muscle cramps with spironolactone and is unable to tolerate more than 100 mg daily.  she is not financially interested in humira at this time.  She is not a candidate for metformin as her HGB A1C is wnl. I would consider oracea once her flare subsides.  She was offered ILKS for future painful lesions. Render Risk Assessment In Note?: no Detail Level: Simple

## 2023-01-13 NOTE — CONSULT NOTE ADULT - SUBJECTIVE AND OBJECTIVE BOX
Patient is a 42y old  Female who presents with a chief complaint of cough (13 Jan 2023 03:55)      HPI:  41 yo F w h/o htn, hld, pre-dm, hypothyroid presents for persistent nonproductive cough and sob. Patient states that this cough has been intermittent over the last two years and most recently flared up this past saturday (1/7), and has been accompanied by difficulty bretaing. Patient was most recently admitted 12/2022 for similar presentation. Patient denies smoking or ever being diagnosed with asthma or copd, but confirms  smokes. Of note, patient apparently has a scheduled pulm appointment next month. Also to note, patient endorses snoring since the birth of her children. Patient with no other acute complaints; ros neg.     In ED, T 98.7, /95, HR 76. RR 24, SpO2 96% on RA; labs unremarkable; CXR neg    Patient given steroids, magnesium and nebulizer and admitted for further evaluation. (13 Jan 2023 03:55)      PAST MEDICAL & SURGICAL HISTORY:  HTN (hypertension)      HLD (hyperlipidemia)      DM (diabetes mellitus)      Hypothyroid          SOCIAL HX:   Smoking        never    FAMILY HISTORY:  .  No cardiovascular or pulmonary family history     REVIEW OF SYSTEMS:    All ROS are negative except per HPI     Allergies    No Known Allergies    Intolerances          PHYSICAL EXAM  Vital Signs Last 24 Hrs  T(C): 36.9 (13 Jan 2023 05:35), Max: 37.1 (12 Jan 2023 20:31)  T(F): 98.4 (13 Jan 2023 05:35), Max: 98.7 (12 Jan 2023 20:31)  HR: 104 (13 Jan 2023 05:35) (76 - 104)  BP: 119/82 (13 Jan 2023 05:35) (119/82 - 145/95)  RR: 20 (13 Jan 2023 05:35) (20 - 24)  SpO2: 96% (12 Jan 2023 20:31) (96% - 96%)    Parameters below as of 12 Jan 2023 20:31  Patient On (Oxygen Delivery Method): room air        CONSTITUTIONAL:  Well nourished.  NAD    ENT:   Airway patent,   No thrush    EYES:   Clear bilaterally,   pupils equal,   round and reactive to light.    CARDIAC:   Normal rate,   regular rhythm.    no edema    RESPIRATORY:   No wheezing  normal chest expansion  Not tachypneic,  No use of accessory muscles    GASTROINTESTINAL:  Abdomen soft,   non-tender,   no guarding,   + BS    MUSCULOSKELETAL:   range of motion is not limited,  no clubbing, cyanosis    NEUROLOGICAL:   Alert and oriented   no motor deficits.    SKIN:   Skin normal color for race,   No evidence of rash.    PSYCHIATRIC:   normal mood and affect.   no apparent risk to self or others.            LABS:                          13.3   10.46 )-----------( 344      ( 12 Jan 2023 21:54 )             41.3                                               01-12    137  |  104  |  8<L>  ----------------------------<  101<H>  4.1   |  24  |  0.6<L>    Ca    9.5      12 Jan 2023 21:54    TPro  7.1  /  Alb  4.6  /  TBili  0.3  /  DBili  x   /  AST  16  /  ALT  20  /  AlkPhos  96  01-12                                                 CARDIAC MARKERS ( 12 Jan 2023 21:54 )  x     / <0.01 ng/mL / x     / x     / x                                                LIVER FUNCTIONS - ( 12 Jan 2023 21:54 )  Alb: 4.6 g/dL / Pro: 7.1 g/dL / ALK PHOS: 96 U/L / ALT: 20 U/L / AST: 16 U/L / GGT: x                                                                                                MEDICATIONS  (STANDING):  albuterol    90 MICROgram(s) HFA Inhaler 2 Puff(s) Inhalation every 6 hours  albuterol/ipratropium for Nebulization 3 milliLiter(s) Nebulizer every 6 hours  atorvastatin 20 milliGRAM(s) Oral at bedtime  budesonide 160 MICROgram(s)/formoterol 4.5 MICROgram(s) Inhaler 2 Puff(s) Inhalation two times a day  chlorhexidine 2% Cloths 1 Application(s) Topical <User Schedule>  enoxaparin Injectable 40 milliGRAM(s) SubCutaneous every 24 hours  guaifenesin/dextromethorphan Oral Liquid 10 milliLiter(s) Oral every 4 hours  levothyroxine 88 MICROGram(s) Oral daily  losartan 50 milliGRAM(s) Oral daily  methylPREDNISolone sodium succinate Injectable 40 milliGRAM(s) IV Push two times a day    MEDICATIONS  (PRN):  acetaminophen     Tablet .. 650 milliGRAM(s) Oral every 6 hours PRN Temp greater or equal to 38C (100.4F), Mild Pain (1 - 3), Moderate Pain (4 - 6)  aluminum hydroxide/magnesium hydroxide/simethicone Suspension 30 milliLiter(s) Oral every 4 hours PRN Dyspepsia  benzonatate 100 milliGRAM(s) Oral every 8 hours PRN Cough  melatonin 5 milliGRAM(s) Oral at bedtime PRN Insomnia      X-Rays reviewed:    CXR interpreted by me:

## 2023-01-13 NOTE — H&P ADULT - ATTENDING COMMENTS
HPI:  41 yo F w h/o htn, hld, pre-dm, hypothyroid presents for persistent nonproductive cough and sob. Patient states that this cough has been intermittent over the last two years and most recently flared up this past Saturday (1/7), and has been accompanied by difficulty breathing. Patient was most recently admitted 12/2022 for similar presentation. Patient denies smoking or ever being diagnosed with asthma or copd, but confirms  smokes. Of note, patient apparently has a scheduled pulm appointment next month. Also to note, patient endorses snoring since the birth of her children. Patient with no other acute complaints; ros neg.     ADDITIONAL HISTORY - Patient gave a history of inhaler overuse over the previous 24 hours and  Pulse ox at home was 89%     In ED, T 98.7, /95, HR 76. RR 24, SpO2 96% on RA; labs unremarkable; CXR neg    Patient given steroids, magnesium and nebulizer and admitted for further evaluation. (13 Jan 2023 03:55)    REVIEW OF SYSTEMS: see cc/HPI   CONSTITUTIONAL: No weakness, fevers or chills  EYES/ENT: No visual changes;  No vertigo or throat pain   NECK: No pain or stiffness  RESPIRATORY: (+) cough,(+) wheezing, (+) hemoptysis; (+) shortness of breath (+) second hand smoke exposure  CARDIOVASCULAR: No chest pain or palpitations  GASTROINTESTINAL: No abdominal or epigastric pain. No nausea, vomiting, or hematemesis; No diarrhea or constipation. No melena or hematochezia.  GENITOURINARY: No dysuria, frequency or hematuria  NEUROLOGICAL: No numbness or weakness  SKIN: No itching, rashes    Physical Exam:   General: WN/WD NAD  Neurology: A&Ox3, nonfocal, follows commands  Eyes: PERRLA/ EOMI  ENT/Neck: Neck supple, trachea midline, No JVD  Respiratory: (+) B/L decreased breath sounds, No wheezing, No rales, No rhonchi  CV: Normal rate regular rhythm, S1S2, no murmurs, rubs or gallops  Abdominal: Soft, NT, ND +BS,   Extremities: No edema, + peripheral pulses  Skin: No Rashes, Hematoma, Ecchymosis  Incisions:   Tubes:    A/p  Dyspnea / wheezing / hypoxia at home ( Sat of 89% ) r/o Reactive airway disease vs. asthma   -c/w bronchodilator and ICS  -Pulm eval   -O2 monitoring Peak flow   -outpatient PFR   -avoid exposure to 2nd hand smoke   -agree w/ changing ACEI to ARB     HTN   - ARB     Dyslipidemia   -statin     Pre-diabetes   -outpatient monitoring     DVT prophylaxis

## 2023-01-13 NOTE — H&P ADULT - NSICDXPASTMEDICALHX_GEN_ALL_CORE_FT
PAST MEDICAL HISTORY:  DM (diabetes mellitus)     HLD (hyperlipidemia)     HTN (hypertension)     Hypothyroid

## 2023-01-13 NOTE — H&P ADULT - NSHPLABSRESULTS_GEN_ALL_CORE
Labs:                        13.3   10.46 )-----------( 344      ( 12 Jan 2023 21:54 )             41.3     137  |  104  |  8<L>  ----------------------------<  101<H>  4.1   |  24  |  0.6<L>    Ca    9.5      12 Jan 2023 21:54    TPro  7.1  /  Alb  4.6  /  TBili  0.3  /  DBili  x   /  AST  16  /  ALT  20  /  AlkPhos  96  01-12    Troponin T, Serum: <0.01 ng/mL (01-12-23 @ 21:54)    Serum Pro-Brain Natriuretic Peptide: <5 pg/mL (01-12-23 @ 21:54)

## 2023-01-13 NOTE — CONSULT NOTE ADULT - ASSESSMENT
IMPRESSION:    Probable asthma exacerbation  Peripheral eosinophilia   Dyspnea/ Cough      PLAN:    RVP panel sent and is negative   Significant peripheral eosinophilia noted; check IGE level when off steroids  Recommend to check Aspergillus IgG, IgE as well; this can all be done outpatient   Continue Symbicort 160 BID; Albuterol as needed  Recommend Prednisone 40mg daily to be tapered slowly over 10-14 days   Recommend outpatient allergist evaluation   Continue DVT ppx  Outpatient pulm follow up    IMPRESSION:    Probable asthma exacerbation  Peripheral eosinophilia   Dyspnea/ Cough      PLAN:    RVP panel sent and is negative   Significant peripheral eosinophilia noted; check IGE level when off steroids  Recommend to check Aspergillus IgG, IgE as well; this can all be done outpatient   Continue Symbicort 160 BID; Albuterol as needed  Recommend outpatient allergist evaluation   Continue DVT ppx  Outpatient pulm follow up    IMPRESSION:    Peripheral eosinophilia, this is very high and is concerning for another etiology than asthma  Probable asthma exacerbation  Dyspnea/ Cough      PLAN:    No steroids until hematology consult, in case they want bone marrow biopsy  Recommend hematology consult  Obtain UA, ANCA  Continue Symbicort 160 BID; Albuterol as needed  Continue DVT ppx  Outpatient pulm follow up for PFTs and IGE levels

## 2023-01-13 NOTE — H&P ADULT - NSHPPHYSICALEXAM_GEN_ALL_CORE
Vital Signs Last 24 Hrs  T(C): 37.1 (12 Jan 2023 20:31), Max: 37.1 (12 Jan 2023 20:31)  T(F): 98.7 (12 Jan 2023 20:31), Max: 98.7 (12 Jan 2023 20:31)  HR: 76 (12 Jan 2023 20:31) (76 - 76)  BP: 145/95 (12 Jan 2023 20:31) (145/95 - 145/95)  RR: 24 (12 Jan 2023 20:31) (24 - 24)  SpO2: 96% (12 Jan 2023 20:31) (96% - 96%)    Parameters below as of 12 Jan 2023 20:31  Patient On (Oxygen Delivery Method): room air    PHYSICAL EXAM  GENERAL: NAD  HEAD:  NCAT, EOMI, MMM  NECK: Supple, Nontender  NERVOUS SYSTEM: AAOx3, NFD  CHEST/LUNG: CTA b/l, no wheeze  HEART: +s1s2 RRR  ABDOMEN: soft, NT/ND  EXTREMITIES:  pp, no edema  SKIN: No rashes or lesions

## 2023-01-13 NOTE — H&P ADULT - ASSESSMENT
41 yo F w h/o htn, hld, pre-dm, hypothyroid presents for persistent nonproductive cough and sob, given steroids, magnesium and nebulizer and admitted for further evaluation.    #Nonproductive cough and sob likely cough-variant asthma  - s/p nebs, steroids and magnesium in ED after initial exam revealed wheeze  - currently, physical exam, labs and imaging unremarkable  - pulm consulted - f/u recs  - started symbicort and albuterol; along w robitussin and tessalon pearls  - switching home acei as this could be contributing to cough  - will hold off on starting steroids for now after discussion with patient until pulm eval  - diet and lifestyle modifications (BMI 31.1) & outpatient sleep study for [facility] eval    #HTN   - switching home lisinopril to losartan (states she was recently switched from amlodipine to lisinopril)    #HLD  - cont statin    #pre-DM (last A1c 5.8)  - monitor fs and add b/b insulin prn    #Hypothyroid  - cont home synthroid and f/u tsh    #H/o mood disorder  - prescribed zoloft but has not taken it    DVT ppx: lovenox  GI ppx: NI  Diet: DASH  Activity: IAT   43 yo F w h/o htn, hld, pre-dm, hypothyroid presents for persistent nonproductive cough and sob, given steroids, magnesium and nebulizer and admitted for further evaluation.    #Nonproductive cough and sob likely cough-variant asthma - HD stable saturating well on RA without wheeze  - s/p nebs, steroids and magnesium in ED after initial exam revealed wheeze  - currently, physical exam, labs and imaging unremarkable  - pulm consulted - f/u recs  - started symbicort and albuterol; along w robitussin and tessalon pearls  - switching home acei as this could be contributing to cough  - will hold off on starting steroids for now after discussion with patient until pulm eval  - diet and lifestyle modifications (BMI 31.1) & outpatient sleep study for [facility] eval    #HTN   - switching home lisinopril to losartan (states she was recently switched from amlodipine to lisinopril)    #HLD  - cont statin    #pre-DM (last A1c 5.8)  - monitor fs and add b/b insulin prn    #Hypothyroid  - cont home synthroid and f/u tsh    #H/o mood disorder  - prescribed zoloft but has not taken it    DVT ppx: lovenox  GI ppx: NI  Diet: DASH  Activity: IAT

## 2023-01-14 LAB
ALBUMIN SERPL ELPH-MCNC: 3.7 G/DL — SIGNIFICANT CHANGE UP (ref 3.5–5.2)
ALP SERPL-CCNC: 80 U/L — SIGNIFICANT CHANGE UP (ref 30–115)
ALT FLD-CCNC: 15 U/L — SIGNIFICANT CHANGE UP (ref 0–41)
ANION GAP SERPL CALC-SCNC: 9 MMOL/L — SIGNIFICANT CHANGE UP (ref 7–14)
AST SERPL-CCNC: 11 U/L — SIGNIFICANT CHANGE UP (ref 0–41)
BASOPHILS # BLD AUTO: 0.05 K/UL — SIGNIFICANT CHANGE UP (ref 0–0.2)
BASOPHILS NFR BLD AUTO: 0.4 % — SIGNIFICANT CHANGE UP (ref 0–1)
BILIRUB SERPL-MCNC: 0.4 MG/DL — SIGNIFICANT CHANGE UP (ref 0.2–1.2)
BUN SERPL-MCNC: 11 MG/DL — SIGNIFICANT CHANGE UP (ref 10–20)
CALCIUM SERPL-MCNC: 8.7 MG/DL — SIGNIFICANT CHANGE UP (ref 8.4–10.5)
CHLORIDE SERPL-SCNC: 105 MMOL/L — SIGNIFICANT CHANGE UP (ref 98–110)
CO2 SERPL-SCNC: 25 MMOL/L — SIGNIFICANT CHANGE UP (ref 17–32)
CREAT SERPL-MCNC: 0.8 MG/DL — SIGNIFICANT CHANGE UP (ref 0.7–1.5)
EGFR: 94 ML/MIN/1.73M2 — SIGNIFICANT CHANGE UP
EOSINOPHIL # BLD AUTO: 0.15 K/UL — SIGNIFICANT CHANGE UP (ref 0–0.7)
EOSINOPHIL NFR BLD AUTO: 1.3 % — SIGNIFICANT CHANGE UP (ref 0–8)
GLUCOSE BLDC GLUCOMTR-MCNC: 114 MG/DL — HIGH (ref 70–99)
GLUCOSE BLDC GLUCOMTR-MCNC: 125 MG/DL — HIGH (ref 70–99)
GLUCOSE BLDC GLUCOMTR-MCNC: 168 MG/DL — HIGH (ref 70–99)
GLUCOSE BLDC GLUCOMTR-MCNC: 168 MG/DL — HIGH (ref 70–99)
GLUCOSE SERPL-MCNC: 128 MG/DL — HIGH (ref 70–99)
HCT VFR BLD CALC: 38.3 % — SIGNIFICANT CHANGE UP (ref 37–47)
HGB BLD-MCNC: 12 G/DL — SIGNIFICANT CHANGE UP (ref 12–16)
IMM GRANULOCYTES NFR BLD AUTO: 0.3 % — SIGNIFICANT CHANGE UP (ref 0.1–0.3)
LYMPHOCYTES # BLD AUTO: 2.53 K/UL — SIGNIFICANT CHANGE UP (ref 1.2–3.4)
LYMPHOCYTES # BLD AUTO: 21.2 % — SIGNIFICANT CHANGE UP (ref 20.5–51.1)
MAGNESIUM SERPL-MCNC: 1.9 MG/DL — SIGNIFICANT CHANGE UP (ref 1.8–2.4)
MCHC RBC-ENTMCNC: 23.4 PG — LOW (ref 27–31)
MCHC RBC-ENTMCNC: 31.3 G/DL — LOW (ref 32–37)
MCV RBC AUTO: 74.7 FL — LOW (ref 81–99)
MONOCYTES # BLD AUTO: 0.81 K/UL — HIGH (ref 0.1–0.6)
MONOCYTES NFR BLD AUTO: 6.8 % — SIGNIFICANT CHANGE UP (ref 1.7–9.3)
NEUTROPHILS # BLD AUTO: 8.37 K/UL — HIGH (ref 1.4–6.5)
NEUTROPHILS NFR BLD AUTO: 70 % — SIGNIFICANT CHANGE UP (ref 42.2–75.2)
NRBC # BLD: 0 /100 WBCS — SIGNIFICANT CHANGE UP (ref 0–0)
PLATELET # BLD AUTO: 332 K/UL — SIGNIFICANT CHANGE UP (ref 130–400)
POTASSIUM SERPL-MCNC: 4.2 MMOL/L — SIGNIFICANT CHANGE UP (ref 3.5–5)
POTASSIUM SERPL-SCNC: 4.2 MMOL/L — SIGNIFICANT CHANGE UP (ref 3.5–5)
PROCALCITONIN SERPL-MCNC: <0.02 NG/ML — SIGNIFICANT CHANGE UP (ref 0.02–0.1)
PROT SERPL-MCNC: 6 G/DL — SIGNIFICANT CHANGE UP (ref 6–8)
RBC # BLD: 5.13 M/UL — SIGNIFICANT CHANGE UP (ref 4.2–5.4)
RBC # FLD: 18 % — HIGH (ref 11.5–14.5)
SODIUM SERPL-SCNC: 139 MMOL/L — SIGNIFICANT CHANGE UP (ref 135–146)
TSH SERPL-MCNC: 0.62 UIU/ML — SIGNIFICANT CHANGE UP (ref 0.27–4.2)
WBC # BLD: 11.95 K/UL — HIGH (ref 4.8–10.8)
WBC # FLD AUTO: 11.95 K/UL — HIGH (ref 4.8–10.8)

## 2023-01-14 PROCEDURE — 99233 SBSQ HOSP IP/OBS HIGH 50: CPT

## 2023-01-14 PROCEDURE — 99254 IP/OBS CNSLTJ NEW/EST MOD 60: CPT

## 2023-01-14 RX ADMIN — Medication 10 MILLILITER(S): at 02:25

## 2023-01-14 RX ADMIN — Medication 10 MILLILITER(S): at 14:17

## 2023-01-14 RX ADMIN — Medication 100 MILLIGRAM(S): at 00:26

## 2023-01-14 RX ADMIN — Medication 3 MILLILITER(S): at 02:32

## 2023-01-14 RX ADMIN — Medication 10 MILLILITER(S): at 05:40

## 2023-01-14 RX ADMIN — Medication 3 MILLILITER(S): at 15:51

## 2023-01-14 RX ADMIN — BUDESONIDE AND FORMOTEROL FUMARATE DIHYDRATE 2 PUFF(S): 160; 4.5 AEROSOL RESPIRATORY (INHALATION) at 09:45

## 2023-01-14 RX ADMIN — Medication 100 MILLIGRAM(S): at 21:10

## 2023-01-14 RX ADMIN — Medication 10 MILLILITER(S): at 21:10

## 2023-01-14 RX ADMIN — LOSARTAN POTASSIUM 50 MILLIGRAM(S): 100 TABLET, FILM COATED ORAL at 05:39

## 2023-01-14 RX ADMIN — CHLORHEXIDINE GLUCONATE 1 APPLICATION(S): 213 SOLUTION TOPICAL at 05:40

## 2023-01-14 RX ADMIN — Medication 3 MILLILITER(S): at 20:49

## 2023-01-14 RX ADMIN — Medication 10 MILLILITER(S): at 18:14

## 2023-01-14 RX ADMIN — Medication 100 MILLIGRAM(S): at 12:16

## 2023-01-14 RX ADMIN — Medication 10 MILLILITER(S): at 09:45

## 2023-01-14 RX ADMIN — ENOXAPARIN SODIUM 40 MILLIGRAM(S): 100 INJECTION SUBCUTANEOUS at 17:16

## 2023-01-14 RX ADMIN — Medication 88 MICROGRAM(S): at 05:39

## 2023-01-14 RX ADMIN — Medication 3 MILLILITER(S): at 08:11

## 2023-01-14 RX ADMIN — ATORVASTATIN CALCIUM 20 MILLIGRAM(S): 80 TABLET, FILM COATED ORAL at 21:10

## 2023-01-14 NOTE — PROGRESS NOTE ADULT - ASSESSMENT
43 yo F w h/o htn, hld, pre-dm, hypothyroid presents for persistent nonproductive cough and sob, given steroids, magnesium and nebulizer and admitted for further evaluation.    A/P:   Cough and SOB:   Possibly Asthma Exacerbation:   Patient with wheezing, dyspnea with minimal activity and while talking, now improved.   CXR was clear.   Eosinophilia in WBC, now improved after Steroid.   Pulmonary consult recommended further work up for eosinophilia,   Continue Symbicort, no systemic steroid per pulmonary,  Possible Asthma attack,     HTN: continue Losartan.      HLD: cont statin    Hypothyroid: continue synthroid and f/u tsh    DVT ppx: lovenox  GI ppx: NI  Diet: DASH  Activity: IAT

## 2023-01-14 NOTE — PROGRESS NOTE ADULT - SUBJECTIVE AND OBJECTIVE BOX
IRMA XIE  42y  Female      Patient is a 42y old  Female who presents with a chief complaint of cough (13 Jan 2023 10:27)      INTERVAL HPI/OVERNIGHT EVENTS:  She feels better, still with cough, SOB improved.   Vital Signs Last 24 Hrs  T(C): 37.1 (14 Jan 2023 13:51), Max: 37.1 (14 Jan 2023 13:51)  T(F): 98.8 (14 Jan 2023 13:51), Max: 98.8 (14 Jan 2023 13:51)  HR: 79 (14 Jan 2023 13:51) (79 - 92)  BP: 128/89 (14 Jan 2023 13:51) (113/69 - 133/78)  BP(mean): 100 (13 Jan 2023 21:00) (100 - 100)  RR: 18 (14 Jan 2023 13:51) (16 - 18)  SpO2: --                Consultant(s) Notes Reviewed:  [x ] YES  [ ] NO          MEDICATIONS  (STANDING):  albuterol    90 MICROgram(s) HFA Inhaler 2 Puff(s) Inhalation every 6 hours  albuterol/ipratropium for Nebulization 3 milliLiter(s) Nebulizer every 6 hours  atorvastatin 20 milliGRAM(s) Oral at bedtime  budesonide 160 MICROgram(s)/formoterol 4.5 MICROgram(s) Inhaler 2 Puff(s) Inhalation two times a day  chlorhexidine 2% Cloths 1 Application(s) Topical <User Schedule>  enoxaparin Injectable 40 milliGRAM(s) SubCutaneous every 24 hours  guaifenesin/dextromethorphan Oral Liquid 10 milliLiter(s) Oral every 4 hours  levothyroxine 88 MICROGram(s) Oral daily  losartan 50 milliGRAM(s) Oral daily    MEDICATIONS  (PRN):  acetaminophen     Tablet .. 650 milliGRAM(s) Oral every 6 hours PRN Temp greater or equal to 38C (100.4F), Mild Pain (1 - 3), Moderate Pain (4 - 6)  aluminum hydroxide/magnesium hydroxide/simethicone Suspension 30 milliLiter(s) Oral every 4 hours PRN Dyspepsia  benzonatate 100 milliGRAM(s) Oral every 8 hours PRN Cough  melatonin 5 milliGRAM(s) Oral at bedtime PRN Insomnia      LABS                          12.0   11.95 )-----------( 332      ( 14 Jan 2023 06:41 )             38.3     01-14    139  |  105  |  11  ----------------------------<  128<H>  4.2   |  25  |  0.8    Ca    8.7      14 Jan 2023 06:41  Mg     1.9     01-14    TPro  6.0  /  Alb  3.7  /  TBili  0.4  /  DBili  x   /  AST  11  /  ALT  15  /  AlkPhos  80  01-14          Lactate Trend    CARDIAC MARKERS ( 12 Jan 2023 21:54 )  x     / <0.01 ng/mL / x     / x     / x          CAPILLARY BLOOD GLUCOSE      POCT Blood Glucose.: 168 mg/dL (14 Jan 2023 11:19)        RADIOLOGY & ADDITIONAL TESTS:    Imaging Personally Reviewed:  [ ] YES  [ ] NO    HEALTH ISSUES - PROBLEM Dx:          PHYSICAL EXAM:  GENERAL: NAD, well-developed.  HEAD:  Atraumatic, Normocephalic.  EYES: EOMI, PERRLA, conjunctiva and sclera clear.  NECK: Supple, No JVD.  CHEST/LUNG: Clear to auscultation bilaterally; No wheeze.  HEART: Regular rate and rhythm; S1 S2.   ABDOMEN: Soft, Nontender, Nondistended; Bowel sounds present.  EXTREMITIES:  2+ Peripheral Pulses, No clubbing, cyanosis, or edema.  PSYCH: AAOx3.  NEUROLOGY: non-focal.  SKIN: No rashes or lesions.

## 2023-01-14 NOTE — CONSULT NOTE ADULT - ASSESSMENT
41 yo F w h/o htn, hld, pre-dm, hypothyroid presents for persistent nonproductive cough and sob. Patient states that this cough has been intermittent over the last two years and most recently flared up this past Saturday (1/7), and has been accompanied by difficulty breathing Patient was most recently admitted 12/2022 for similar presentation.      Hematology and Oncology consulted due to hypereosinophilia    # Eosinophilia  #? Asthma    -eosinophil elevated to 1.81  in 12/22  -eosinophil elevated to 1.92 this admission  -eosinophil trended down and currently normal  -no rash anywhere, No allergies, no recent change in detergents, soaps and shampoos.  -no recent use of new medications.  -CTA chest 12/22 : No acute pathology  -ECHO : 12/27 EF 67%.      Plan  -steroids were held due to the suspicion for Hypereosinophilic syndrome and possible interference with Bone marrow biopsy if needed.  -can restart steroids if needed for? asthma exacerbation; No contraindication from hem/onc standpoint.  -follow up with Pulmonology for Pulmonary function tests.  - please obtain serum immunoglobulins, serum tryptase and vitamin b12  -please send stool for ova and parasites.  -please get CT abdomen and pelvis with contrast.  -please send Adria 2, BCR/ABL  -Please send flow cytometry two lavender and two dark green tubes.  -please send EBV viral panel  -Platelet-derived growth factor receptor alpha (eg, the [id number] fusion)  -Platelet-derived growth factor receptor beta (PDGFRB) such as t(5;12)(q31-35;p13) [19]  -Fibroblast growth factor receptor 1 (FGFR1)  -Please send  KIT [id number]     41 yo F w h/o htn, hld, pre-dm, hypothyroid presents for persistent nonproductive cough and sob. Patient states that this cough has been intermittent over the last two years and most recently flared up this past Saturday (1/7), and has been accompanied by difficulty breathing Patient was most recently admitted 12/2022 for similar presentation.      Hematology and Oncology consulted due to hypereosinophilia    # Eosinophilia  #? Asthma    -eosinophil elevated to 1.81  in 12/22  -eosinophil elevated to 1.92 this admission  -eosinophil trended down and currently normal  -no rash anywhere, No allergies, no recent change in detergents, soaps and shampoos.  -no recent use of new medications.  -CTA chest 12/22 : No acute pathology  -ECHO : 12/27 EF 67%.      #Microcytosis noted  -please send Iron pane and ferritin.      Plan  -steroids were held due to the suspicion for Hypereosinophilic syndrome and possible interference with Bone marrow biopsy if needed.  -can restart steroids if needed for? asthma exacerbation; No contraindication from hem/onc standpoint.  -follow up with Pulmonology for Pulmonary function tests.  - please obtain serum immunoglobulins, serum tryptase and vitamin b12  -please send stool for ova and parasites.  -please get CT abdomen and pelvis with contrast.  -please send Adria 2, BCR/ABL  -Please send flow cytometry two lavender and two dark green tubes.  -please send EBV viral panel  -Platelet-derived growth factor receptor alpha (eg, the [id number] fusion)  -Platelet-derived growth factor receptor beta (PDGFRB) such as t(5;12)(q31-35;p13) [19]  -Fibroblast growth factor receptor 1 (FGFR1)  -Please send  KIT [id number]    -can follow up as out patient from hem/onc standpoint.     41 yo F w h/o htn, hld, pre-dm, hypothyroid presents for persistent nonproductive cough and sob. Patient states that this cough has been intermittent over the last two years and most recently flared up this past Saturday (1/7), and has been accompanied by difficulty breathing Patient was most recently admitted 12/2022 for similar presentation.      Hematology and Oncology consulted due to hypereosinophilia    # Eosinophilia  #? Asthma    -eosinophil elevated to 1.81  in 12/22  -eosinophil elevated to 1.92 this admission  -eosinophil trended down and currently normal  -no rash anywhere, No allergies, no recent change in detergents, soaps and shampoos.  -no recent use of new medications.  -CTA chest 12/22 : No acute pathology  -ECHO : 12/27 EF 67%.      #Microcytosis noted  -please send Iron pane and ferritin.      Plan  -steroids were held due to the suspicion for Hypereosinophilic syndrome and possible interference with Bone marrow biopsy if needed.  -can restart steroids if needed for? asthma exacerbation; No contraindication from hem/onc standpoint.  -follow up with Pulmonology for Pulmonary function tests.  - please obtain serum immunoglobulins, serum tryptase and vitamin b12  -please send stool for ova and parasites.  -please get CT abdomen and pelvis with contrast.  -please send Adria 2, BCR/ABL  -Please send flow cytometry two lavender and two dark green tubes.  -please send EBV viral panel  -Platelet-derived growth factor receptor alpha ( the [id number] fusion)  -Platelet-derived growth factor receptor beta (PDGFRB) such as t(5;12)(q31-35;p13) [19]  -Fibroblast growth factor receptor 1 (FGFR1)  -Please send  KIT [id number]    -can follow up as out patient from hem/onc standpoint.

## 2023-01-14 NOTE — CONSULT NOTE ADULT - SUBJECTIVE AND OBJECTIVE BOX
Patient is a 42y old  Female who presents with a chief complaint of cough (13 Jan 2023 10:27)      HPI:  43 yo F w h/o htn, hld, pre-dm, hypothyroid presents for persistent nonproductive cough and sob. Patient states that this cough has been intermittent over the last two years and most recently flared up this past saturday (1/7), and has been accompanied by difficulty bretaing. Patient was most recently admitted 12/2022 for similar presentation. Patient denies smoking or ever being diagnosed with asthma or copd, but confirms  smokes. Of note, patient apparently has a scheduled pulm appointment next month. Also to note, patient endorses snoring since the birth of her children. Patient with no other acute complaints; ros neg.     In ED, T 98.7, /95, HR 76. RR 24, SpO2 96% on RA; labs unremarkable; CXR neg    Patient given steroids, magnesium and nebulizer and admitted for further evaluation. (13 Jan 2023 03:55)       ROS:  Negative except for:    PAST MEDICAL & SURGICAL HISTORY:  HTN (hypertension)      HLD (hyperlipidemia)      DM (diabetes mellitus)      Hypothyroid          SOCIAL HISTORY: Denies smoking cigarettes and drinking alcohol.    FAMILY HISTORY:      MEDICATIONS  (STANDING):  albuterol    90 MICROgram(s) HFA Inhaler 2 Puff(s) Inhalation every 6 hours  albuterol/ipratropium for Nebulization 3 milliLiter(s) Nebulizer every 6 hours  atorvastatin 20 milliGRAM(s) Oral at bedtime  budesonide 160 MICROgram(s)/formoterol 4.5 MICROgram(s) Inhaler 2 Puff(s) Inhalation two times a day  chlorhexidine 2% Cloths 1 Application(s) Topical <User Schedule>  enoxaparin Injectable 40 milliGRAM(s) SubCutaneous every 24 hours  guaifenesin/dextromethorphan Oral Liquid 10 milliLiter(s) Oral every 4 hours  levothyroxine 88 MICROGram(s) Oral daily  losartan 50 milliGRAM(s) Oral daily    MEDICATIONS  (PRN):  acetaminophen     Tablet .. 650 milliGRAM(s) Oral every 6 hours PRN Temp greater or equal to 38C (100.4F), Mild Pain (1 - 3), Moderate Pain (4 - 6)  aluminum hydroxide/magnesium hydroxide/simethicone Suspension 30 milliLiter(s) Oral every 4 hours PRN Dyspepsia  benzonatate 100 milliGRAM(s) Oral every 8 hours PRN Cough  melatonin 5 milliGRAM(s) Oral at bedtime PRN Insomnia      Allergies    No Known Allergies    Intolerances        Vital Signs Last 24 Hrs  T(C): 37.1 (14 Jan 2023 13:51), Max: 37.1 (14 Jan 2023 13:51)  T(F): 98.8 (14 Jan 2023 13:51), Max: 98.8 (14 Jan 2023 13:51)  HR: 79 (14 Jan 2023 13:51) (79 - 92)  BP: 128/89 (14 Jan 2023 13:51) (113/69 - 133/78)  BP(mean): 100 (13 Jan 2023 21:00) (100 - 100)  RR: 18 (14 Jan 2023 13:51) (16 - 18)  SpO2: --        PHYSICAL EXAM  General: adult in NAD  HEENT: clear oropharynx, anicteric sclera, pink conjunctiva  Neck: supple  CV: normal S1/S2 with no murmur rubs or gallops  Lungs: positive air movement b/l ant lungs,clear to auscultation, no wheezes, no rales  Abdomen: soft non-tender non-distended, no hepatosplenomegaly  Ext: no clubbing cyanosis or edema  Skin: no rashes and no petechiae  Neuro: alert and oriented X 4, no focal deficits      LABS:                          12.0   11.95 )-----------( 332      ( 14 Jan 2023 06:41 )             38.3         Mean Cell Volume : 74.7 fL  Mean Cell Hemoglobin : 23.4 pg  Mean Cell Hemoglobin Concentration : 31.3 g/dL  Auto Neutrophil # : 8.37 K/uL  Auto Lymphocyte # : 2.53 K/uL  Auto Monocyte # : 0.81 K/uL  Auto Eosinophil # : 0.15 K/uL  Auto Basophil # : 0.05 K/uL  Auto Neutrophil % : 70.0 %  Auto Lymphocyte % : 21.2 %  Auto Monocyte % : 6.8 %  Auto Eosinophil % : 1.3 %  Auto Basophil % : 0.4 %      Serial CBC's  01-14 @ 06:41  Hct-38.3 / Hgb-12.0 / Plat-332 / RBC-5.13 / WBC-11.95  Serial CBC's  01-13 @ 21:19  Hct-39.0 / Hgb-12.6 / Plat-358 / RBC-5.33 / WBC-12.95  Serial CBC's  01-13 @ 20:45  Hct-38.8 / Hgb-12.5 / Plat-355 / RBC-5.30 / WBC-13.07  Serial CBC's  01-12 @ 21:54  Hct-41.3 / Hgb-13.3 / Plat-344 / RBC-5.64 / WBC-10.46      01-14    139  |  105  |  11  ----------------------------<  128<H>  4.2   |  25  |  0.8    Ca    8.7      14 Jan 2023 06:41  Mg     1.9     01-14    TPro  6.0  /  Alb  3.7  /  TBili  0.4  /  DBili  x   /  AST  11  /  ALT  15  /  AlkPhos  80  01-14

## 2023-01-14 NOTE — CONSULT NOTE ADULT - ATTENDING COMMENTS
Agree with above A/P
Ms Shaw is a very interesting patient, seen and examined today at bedside in the hallway.  The patient reports 2 years of intermittent cough, as well as frequent respiratory infections.   However, her breathing symptoms have significantly exacerbated in the past month or 2.  She was admitted to our hospital in December, which was her first hospitalization ever, for shortness of breath and cough, at that time she was found to be hypoxemic.  She was treated with nebulizers and systemic steroids with good improvement, at that time she was also found to have significant peripheral eosinophilia to about 1900.  She now returns with similar symptoms, though is not hypoxic, and is again experiencing significant peripheral eosinophilia.  Given the acuity of her worsening, I am concerned that this may represent a hypereosinophilic syndrome causing a secondary asthma, rather than a primary type-2 asthma.  Patient has already received systemic steroids this admission, however would recommend holding these until patient can be evaluated by heme-onc.  Continue with Symbicort twice daily and as needed (as per ESTER 2019 guidelines).  Please also send a repeat urinalysis, as well as serum ANCA.  Patient will need close pulmonary follow-up for PFT and further work-up.  I agree with the fellow note, with the exceptions listed in my attestation above.  The remainder of impression and plan per fellow note.

## 2023-01-15 ENCOUNTER — TRANSCRIPTION ENCOUNTER (OUTPATIENT)
Age: 43
End: 2023-01-15

## 2023-01-15 VITALS
SYSTOLIC BLOOD PRESSURE: 120 MMHG | DIASTOLIC BLOOD PRESSURE: 73 MMHG | RESPIRATION RATE: 20 BRPM | TEMPERATURE: 98 F | HEART RATE: 94 BPM

## 2023-01-15 LAB
ALBUMIN SERPL ELPH-MCNC: 3.6 G/DL — SIGNIFICANT CHANGE UP (ref 3.5–5.2)
ALP SERPL-CCNC: 79 U/L — SIGNIFICANT CHANGE UP (ref 30–115)
ALT FLD-CCNC: 15 U/L — SIGNIFICANT CHANGE UP (ref 0–41)
ANION GAP SERPL CALC-SCNC: 10 MMOL/L — SIGNIFICANT CHANGE UP (ref 7–14)
AST SERPL-CCNC: 11 U/L — SIGNIFICANT CHANGE UP (ref 0–41)
BASOPHILS # BLD AUTO: 0.09 K/UL — SIGNIFICANT CHANGE UP (ref 0–0.2)
BASOPHILS NFR BLD AUTO: 1 % — SIGNIFICANT CHANGE UP (ref 0–1)
BILIRUB SERPL-MCNC: 0.4 MG/DL — SIGNIFICANT CHANGE UP (ref 0.2–1.2)
BUN SERPL-MCNC: 11 MG/DL — SIGNIFICANT CHANGE UP (ref 10–20)
CALCIUM SERPL-MCNC: 8.8 MG/DL — SIGNIFICANT CHANGE UP (ref 8.4–10.5)
CHLORIDE SERPL-SCNC: 104 MMOL/L — SIGNIFICANT CHANGE UP (ref 98–110)
CO2 SERPL-SCNC: 24 MMOL/L — SIGNIFICANT CHANGE UP (ref 17–32)
CREAT SERPL-MCNC: 0.7 MG/DL — SIGNIFICANT CHANGE UP (ref 0.7–1.5)
EGFR: 110 ML/MIN/1.73M2 — SIGNIFICANT CHANGE UP
EOSINOPHIL # BLD AUTO: 0.66 K/UL — SIGNIFICANT CHANGE UP (ref 0–0.7)
EOSINOPHIL NFR BLD AUTO: 7.4 % — SIGNIFICANT CHANGE UP (ref 0–8)
GLUCOSE BLDC GLUCOMTR-MCNC: 101 MG/DL — HIGH (ref 70–99)
GLUCOSE BLDC GLUCOMTR-MCNC: 106 MG/DL — HIGH (ref 70–99)
GLUCOSE BLDC GLUCOMTR-MCNC: 151 MG/DL — HIGH (ref 70–99)
GLUCOSE SERPL-MCNC: 106 MG/DL — HIGH (ref 70–99)
HCT VFR BLD CALC: 38.2 % — SIGNIFICANT CHANGE UP (ref 37–47)
HGB BLD-MCNC: 12.4 G/DL — SIGNIFICANT CHANGE UP (ref 12–16)
IMM GRANULOCYTES NFR BLD AUTO: 0.3 % — SIGNIFICANT CHANGE UP (ref 0.1–0.3)
LYMPHOCYTES # BLD AUTO: 3.23 K/UL — SIGNIFICANT CHANGE UP (ref 1.2–3.4)
LYMPHOCYTES # BLD AUTO: 36.4 % — SIGNIFICANT CHANGE UP (ref 20.5–51.1)
MAGNESIUM SERPL-MCNC: 1.9 MG/DL — SIGNIFICANT CHANGE UP (ref 1.8–2.4)
MCHC RBC-ENTMCNC: 23.6 PG — LOW (ref 27–31)
MCHC RBC-ENTMCNC: 32.5 G/DL — SIGNIFICANT CHANGE UP (ref 32–37)
MCV RBC AUTO: 72.8 FL — LOW (ref 81–99)
MONOCYTES # BLD AUTO: 0.64 K/UL — HIGH (ref 0.1–0.6)
MONOCYTES NFR BLD AUTO: 7.2 % — SIGNIFICANT CHANGE UP (ref 1.7–9.3)
NEUTROPHILS # BLD AUTO: 4.22 K/UL — SIGNIFICANT CHANGE UP (ref 1.4–6.5)
NEUTROPHILS NFR BLD AUTO: 47.7 % — SIGNIFICANT CHANGE UP (ref 42.2–75.2)
NRBC # BLD: 0 /100 WBCS — SIGNIFICANT CHANGE UP (ref 0–0)
PLATELET # BLD AUTO: 312 K/UL — SIGNIFICANT CHANGE UP (ref 130–400)
POTASSIUM SERPL-MCNC: 4.1 MMOL/L — SIGNIFICANT CHANGE UP (ref 3.5–5)
POTASSIUM SERPL-SCNC: 4.1 MMOL/L — SIGNIFICANT CHANGE UP (ref 3.5–5)
PROT SERPL-MCNC: 6 G/DL — SIGNIFICANT CHANGE UP (ref 6–8)
RBC # BLD: 5.25 M/UL — SIGNIFICANT CHANGE UP (ref 4.2–5.4)
RBC # FLD: 18.4 % — HIGH (ref 11.5–14.5)
SODIUM SERPL-SCNC: 138 MMOL/L — SIGNIFICANT CHANGE UP (ref 135–146)
WBC # BLD: 8.87 K/UL — SIGNIFICANT CHANGE UP (ref 4.8–10.8)
WBC # FLD AUTO: 8.87 K/UL — SIGNIFICANT CHANGE UP (ref 4.8–10.8)

## 2023-01-15 PROCEDURE — 74177 CT ABD & PELVIS W/CONTRAST: CPT | Mod: 26

## 2023-01-15 PROCEDURE — 99239 HOSP IP/OBS DSCHRG MGMT >30: CPT

## 2023-01-15 RX ORDER — AMLODIPINE BESYLATE 2.5 MG/1
1 TABLET ORAL
Qty: 30 | Refills: 1
Start: 2023-01-15 | End: 2023-03-15

## 2023-01-15 RX ORDER — ALBUTEROL 90 UG/1
2 AEROSOL, METERED ORAL
Qty: 1 | Refills: 0
Start: 2023-01-15 | End: 2023-02-13

## 2023-01-15 RX ORDER — LISINOPRIL 2.5 MG/1
1 TABLET ORAL
Qty: 0 | Refills: 0 | DISCHARGE

## 2023-01-15 RX ORDER — BUDESONIDE AND FORMOTEROL FUMARATE DIHYDRATE 160; 4.5 UG/1; UG/1
1 AEROSOL RESPIRATORY (INHALATION)
Qty: 1 | Refills: 1
Start: 2023-01-15 | End: 2023-03-15

## 2023-01-15 RX ADMIN — CHLORHEXIDINE GLUCONATE 1 APPLICATION(S): 213 SOLUTION TOPICAL at 06:12

## 2023-01-15 RX ADMIN — Medication 10 MILLILITER(S): at 05:29

## 2023-01-15 RX ADMIN — Medication 3 MILLILITER(S): at 15:17

## 2023-01-15 RX ADMIN — ENOXAPARIN SODIUM 40 MILLIGRAM(S): 100 INJECTION SUBCUTANEOUS at 18:51

## 2023-01-15 RX ADMIN — Medication 100 MILLIGRAM(S): at 12:06

## 2023-01-15 RX ADMIN — Medication 10 MILLILITER(S): at 02:57

## 2023-01-15 RX ADMIN — Medication 3 MILLILITER(S): at 08:55

## 2023-01-15 RX ADMIN — Medication 10 MILLILITER(S): at 14:26

## 2023-01-15 RX ADMIN — LOSARTAN POTASSIUM 50 MILLIGRAM(S): 100 TABLET, FILM COATED ORAL at 05:29

## 2023-01-15 RX ADMIN — BUDESONIDE AND FORMOTEROL FUMARATE DIHYDRATE 2 PUFF(S): 160; 4.5 AEROSOL RESPIRATORY (INHALATION) at 09:17

## 2023-01-15 RX ADMIN — Medication 88 MICROGRAM(S): at 05:29

## 2023-01-15 RX ADMIN — Medication 10 MILLILITER(S): at 18:51

## 2023-01-15 RX ADMIN — Medication 10 MILLILITER(S): at 10:21

## 2023-01-15 NOTE — DISCHARGE NOTE NURSING/CASE MANAGEMENT/SOCIAL WORK - PATIENT PORTAL LINK FT
You can access the FollowMyHealth Patient Portal offered by [facility] by registering at the following website: [email or web address]. By joining [facility]’s FollowMyHealth portal, you will also be able to view your health information using other applications (apps) compatible with our system.

## 2023-01-15 NOTE — DISCHARGE NOTE PROVIDER - NSDCFUSCHEDAPPT_GEN_ALL_CORE_FT
Selvin Au  [facility]  PULMMED [facility] Fanny Griffin  Scheduled Appointment: 02/14/2023

## 2023-01-15 NOTE — DISCHARGE NOTE PROVIDER - HOSPITAL COURSE
41 yo F w h/o htn, hld, pre-dm, hypothyroid presents for persistent nonproductive cough and sob, she has on and off symptoms for one year she was admitted for wheezing few weeks ago and discharge home after improvement, again she comes with cough and SOB, she was found with wheezing, received  IV steroid, CXR was stable, recent CT chest was normal, patient was found with mild eosinophilia , pulmonary and hematology consulted, further work up is needed, patient started on Symbicort, feels better, she will be discharge home and follow up outpatient with pulmonary and hematomology.     PHYSICAL EXAM:  GENERAL: NAD, well-developed.  HEAD:  Atraumatic, Normocephalic.  EYES: EOMI, PERRLA, conjunctiva and sclera clear.  NECK: Supple, No JVD.  CHEST/LUNG: Clear to auscultation bilaterally; No wheeze.  HEART: Regular rate and rhythm; S1 S2.   ABDOMEN: Soft, Nontender, Nondistended; Bowel sounds present.  EXTREMITIES:  2+ Peripheral Pulses, No clubbing, cyanosis, or edema.  PSYCH: AAOx3.  NEUROLOGY: non-focal.  SKIN: No rashes or lesions.  A/P:   Cough and SOB:   Possibly Asthma Exacerbation:   Patient with wheezing, dyspnea with minimal activity and while talking, now improved.   CXR was clear.   Eosinophilia in WBC, now improved after Steroid.   Pulmonary consult recommended further work up for eosinophilia,   Hematology follow up outpatient.   Continue Symbicort, no systemic steroid per pulmonary,  Possible Asthma attack,     HTN: Hold Lisinopril for possible cause of cough, start on AMlodipine 5mg daily. .      HLD: cont statin    Hypothyroid: continue synthroid and f/u tsh

## 2023-01-15 NOTE — DISCHARGE NOTE PROVIDER - NSDCCPCAREPLAN_GEN_ALL_CORE_FT
PRINCIPAL DISCHARGE DIAGNOSIS  Diagnosis: Cough  Assessment and Plan of Treatment: possible from allergic asthma

## 2023-01-15 NOTE — DISCHARGE NOTE NURSING/CASE MANAGEMENT/SOCIAL WORK - NSDCPEFALRISK_GEN_ALL_CORE
For information on Fall & Injury Prevention, visit: [email or web address] OR  [email or web address] OR  https://www.cdc.gov/steadi/patient.html

## 2023-01-15 NOTE — DISCHARGE NOTE PROVIDER - CARE PROVIDER_API CALL
Jefry Sweet)  Hematology; Internal Medicine; Medical Oncology  [address] [place] [address]  Phone: [phone]  Fax: [phone]  Follow Up Time: 1 month

## 2023-01-16 PROBLEM — E78.5 HYPERLIPIDEMIA, UNSPECIFIED: Chronic | Status: ACTIVE | Noted: 2023-01-13

## 2023-01-16 PROBLEM — I10 ESSENTIAL (PRIMARY) HYPERTENSION: Chronic | Status: ACTIVE | Noted: 2023-01-13

## 2023-01-16 PROBLEM — E03.9 HYPOTHYROIDISM, UNSPECIFIED: Chronic | Status: ACTIVE | Noted: 2023-01-13

## 2023-01-16 PROBLEM — E11.9 TYPE 2 DIABETES MELLITUS WITHOUT COMPLICATIONS: Chronic | Status: ACTIVE | Noted: 2023-01-13

## 2023-01-16 LAB
EBV EA AB SER IA-ACNC: 9 U/ML — HIGH
EBV EA AB TITR SER IF: POSITIVE
EBV EA IGG SER-ACNC: ABNORMAL
EBV NA IGG SER IA-ACNC: 198 U/ML — HIGH
EBV PATRN SPEC IB-IMP: SIGNIFICANT CHANGE UP
EBV VCA IGG AVIDITY SER QL IA: POSITIVE
EBV VCA IGM SER IA-ACNC: 10.7 U/ML — SIGNIFICANT CHANGE UP
EBV VCA IGM SER IA-ACNC: 475 U/ML — HIGH
EBV VCA IGM TITR FLD: NEGATIVE — SIGNIFICANT CHANGE UP
IGA FLD-MCNC: 208 MG/DL — SIGNIFICANT CHANGE UP (ref 84–499)
IGD SER-MCNC: <1 MG/DL — SIGNIFICANT CHANGE UP
IGG FLD-MCNC: 1069 MG/DL — SIGNIFICANT CHANGE UP (ref 610–1660)
IGM SERPL-MCNC: 83 MG/DL — SIGNIFICANT CHANGE UP (ref 35–242)
KAPPA LC SER QL IFE: 1.94 MG/DL — SIGNIFICANT CHANGE UP (ref 0.33–1.94)
KAPPA/LAMBDA FREE LIGHT CHAIN RATIO, SERUM: 1.25 RATIO — SIGNIFICANT CHANGE UP (ref 0.26–1.65)
LAMBDA LC SER QL IFE: 1.55 MG/DL — SIGNIFICANT CHANGE UP (ref 0.57–2.63)

## 2023-01-17 LAB
CULTURE RESULTS: SIGNIFICANT CHANGE UP
SPECIMEN SOURCE: SIGNIFICANT CHANGE UP

## 2023-01-18 LAB — IGE SERPL-ACNC: 439 KU/L — HIGH

## 2023-01-19 DIAGNOSIS — J45.901 UNSPECIFIED ASTHMA WITH (ACUTE) EXACERBATION: ICD-10-CM

## 2023-01-19 DIAGNOSIS — E78.5 HYPERLIPIDEMIA, UNSPECIFIED: ICD-10-CM

## 2023-01-19 DIAGNOSIS — E03.9 HYPOTHYROIDISM, UNSPECIFIED: ICD-10-CM

## 2023-01-19 DIAGNOSIS — Z77.22 CONTACT WITH AND (SUSPECTED) EXPOSURE TO ENVIRONMENTAL TOBACCO SMOKE (ACUTE) (CHRONIC): ICD-10-CM

## 2023-01-19 DIAGNOSIS — R73.03 PREDIABETES: ICD-10-CM

## 2023-01-19 DIAGNOSIS — R05.9 COUGH, UNSPECIFIED: ICD-10-CM

## 2023-01-19 DIAGNOSIS — I10 ESSENTIAL (PRIMARY) HYPERTENSION: ICD-10-CM

## 2023-01-19 DIAGNOSIS — Z79.84 LONG TERM (CURRENT) USE OF ORAL HYPOGLYCEMIC DRUGS: ICD-10-CM

## 2023-01-19 DIAGNOSIS — Z79.890 HORMONE REPLACEMENT THERAPY: ICD-10-CM

## 2023-01-19 DIAGNOSIS — D72.10 EOSINOPHILIA, UNSPECIFIED: ICD-10-CM

## 2023-01-19 LAB
BCR/ABL BY RT - PCR QUANTITATIVE: SIGNIFICANT CHANGE UP
JAK2 P.V617F BLD/T QL: SIGNIFICANT CHANGE UP

## 2023-02-06 ENCOUNTER — LABORATORY RESULT (OUTPATIENT)
Age: 43
End: 2023-02-06

## 2023-02-06 ENCOUNTER — APPOINTMENT (OUTPATIENT)
Dept: HEMATOLOGY ONCOLOGY | Facility: CLINIC | Age: 43
End: 2023-02-06
Payer: MEDICAID

## 2023-02-06 ENCOUNTER — OUTPATIENT (OUTPATIENT)
Dept: OUTPATIENT SERVICES | Facility: HOSPITAL | Age: 43
LOS: 1 days | End: 2023-02-06
Payer: MEDICAID

## 2023-02-06 ENCOUNTER — APPOINTMENT (OUTPATIENT)
Dept: HEMATOLOGY ONCOLOGY | Facility: CLINIC | Age: 43
End: 2023-02-06

## 2023-02-06 VITALS
HEIGHT: 62 IN | HEART RATE: 86 BPM | DIASTOLIC BLOOD PRESSURE: 86 MMHG | SYSTOLIC BLOOD PRESSURE: 123 MMHG | BODY MASS INDEX: 32.94 KG/M2 | WEIGHT: 179 LBS | TEMPERATURE: 98.3 F

## 2023-02-06 DIAGNOSIS — Z86.79 PERSONAL HISTORY OF OTHER DISEASES OF THE CIRCULATORY SYSTEM: ICD-10-CM

## 2023-02-06 DIAGNOSIS — Z80.3 FAMILY HISTORY OF MALIGNANT NEOPLASM OF BREAST: ICD-10-CM

## 2023-02-06 DIAGNOSIS — Z78.9 OTHER SPECIFIED HEALTH STATUS: ICD-10-CM

## 2023-02-06 DIAGNOSIS — Z86.39 PERSONAL HISTORY OF OTHER ENDOCRINE, NUTRITIONAL AND METABOLIC DISEASE: ICD-10-CM

## 2023-02-06 DIAGNOSIS — R73.03 PREDIABETES.: ICD-10-CM

## 2023-02-06 PROCEDURE — 85027 COMPLETE CBC AUTOMATED: CPT

## 2023-02-06 PROCEDURE — 87205 SMEAR GRAM STAIN: CPT

## 2023-02-06 PROCEDURE — 88237 TISSUE CULTURE BONE MARROW: CPT

## 2023-02-06 PROCEDURE — 83520 IMMUNOASSAY QUANT NOS NONAB: CPT

## 2023-02-06 PROCEDURE — 88271 CYTOGENETICS DNA PROBE: CPT

## 2023-02-06 PROCEDURE — 88189 FLOWCYTOMETRY/READ 16 & >: CPT

## 2023-02-06 PROCEDURE — 99215 OFFICE O/P EST HI 40 MIN: CPT

## 2023-02-06 PROCEDURE — 88275 CYTOGENETICS 100-300: CPT

## 2023-02-06 PROCEDURE — 36415 COLL VENOUS BLD VENIPUNCTURE: CPT

## 2023-02-06 PROCEDURE — 82607 VITAMIN B-12: CPT

## 2023-02-06 PROCEDURE — 88185 FLOWCYTOMETRY/TC ADD-ON: CPT

## 2023-02-07 LAB
HCT VFR BLD CALC: 39.8 %
HGB BLD-MCNC: 12.9 G/DL
MCHC RBC-ENTMCNC: 23.3 PG
MCHC RBC-ENTMCNC: 32.4 G/DL
MCV RBC AUTO: 72 FL
PLATELET # BLD AUTO: 329 K/UL
PMV BLD: 9.7 FL
RBC # BLD: 5.53 M/UL
RBC # FLD: 15.5 %
VIT B12 SERPL-MCNC: 565 PG/ML
WBC # FLD AUTO: 8.07 K/UL

## 2023-02-09 PROBLEM — Z86.79 HISTORY OF HYPERTENSION: Status: RESOLVED | Noted: 2023-02-09 | Resolved: 2023-02-09

## 2023-02-09 PROBLEM — R73.03 PREDIABETES: Status: RESOLVED | Noted: 2023-02-09 | Resolved: 2023-02-09

## 2023-02-09 PROBLEM — Z86.39 HISTORY OF IRON DEFICIENCY: Status: RESOLVED | Noted: 2023-02-09 | Resolved: 2023-02-09

## 2023-02-09 PROBLEM — Z78.9 NON-SMOKER: Status: ACTIVE | Noted: 2023-02-09

## 2023-02-09 PROBLEM — Z86.39 HISTORY OF HYPOTHYROIDISM: Status: RESOLVED | Noted: 2023-02-09 | Resolved: 2023-02-09

## 2023-02-09 PROBLEM — Z80.3 FAMILY HISTORY OF MALIGNANT NEOPLASM OF BREAST: Status: ACTIVE | Noted: 2023-02-09

## 2023-02-09 PROBLEM — Z86.39 HISTORY OF HYPERLIPIDEMIA: Status: RESOLVED | Noted: 2023-02-09 | Resolved: 2023-02-09

## 2023-02-09 LAB — TRYPTASE: 8.3 UG/L

## 2023-02-09 RX ORDER — AMLODIPINE BESYLATE 5 MG/1
5 TABLET ORAL
Qty: 30 | Refills: 0 | Status: ACTIVE | COMMUNITY
Start: 2023-01-15

## 2023-02-09 RX ORDER — LEVOTHYROXINE SODIUM 88 UG/1
88 TABLET ORAL
Qty: 30 | Refills: 0 | Status: ACTIVE | COMMUNITY
Start: 2022-09-06

## 2023-02-09 RX ORDER — PREDNISONE 10 MG/1
10 TABLET ORAL
Qty: 30 | Refills: 0 | Status: DISCONTINUED | COMMUNITY
Start: 2022-12-17

## 2023-02-09 RX ORDER — ATORVASTATIN CALCIUM 20 MG/1
20 TABLET, FILM COATED ORAL
Qty: 30 | Refills: 0 | Status: ACTIVE | COMMUNITY
Start: 2022-09-28

## 2023-02-09 RX ORDER — PREGABALIN 50 MG/1
50 CAPSULE ORAL
Qty: 90 | Refills: 0 | Status: DISCONTINUED | COMMUNITY
Start: 2022-11-09

## 2023-02-09 RX ORDER — AMOXICILLIN AND CLAVULANATE POTASSIUM 875; 125 MG/1; MG/1
875-125 TABLET, COATED ORAL
Qty: 14 | Refills: 0 | Status: DISCONTINUED | COMMUNITY
Start: 2022-09-28

## 2023-02-09 RX ORDER — CLONAZEPAM 0.5 MG/1
0.5 TABLET ORAL
Qty: 14 | Refills: 0 | Status: DISCONTINUED | COMMUNITY
Start: 2022-11-30

## 2023-02-09 RX ORDER — ASPIRIN 81 MG/1
81 TABLET, COATED ORAL
Qty: 90 | Refills: 0 | Status: ACTIVE | COMMUNITY
Start: 2022-09-28

## 2023-02-09 RX ORDER — SERTRALINE 25 MG/1
25 TABLET, FILM COATED ORAL
Qty: 45 | Refills: 0 | Status: DISCONTINUED | COMMUNITY
Start: 2022-12-01

## 2023-02-09 RX ORDER — LISINOPRIL 10 MG/1
10 TABLET ORAL
Qty: 30 | Refills: 0 | Status: DISCONTINUED | COMMUNITY
Start: 2022-11-30

## 2023-02-09 RX ORDER — METFORMIN HYDROCHLORIDE 500 MG/1
500 TABLET, COATED ORAL
Qty: 60 | Refills: 0 | Status: ACTIVE | COMMUNITY
Start: 2022-10-28

## 2023-02-09 RX ORDER — DIAZEPAM 5 MG/1
5 TABLET ORAL
Qty: 2 | Refills: 0 | Status: DISCONTINUED | COMMUNITY
Start: 2022-08-22

## 2023-02-09 RX ORDER — ALBUTEROL SULFATE 90 UG/1
108 (90 BASE) INHALANT RESPIRATORY (INHALATION)
Qty: 7 | Refills: 0 | Status: ACTIVE | COMMUNITY
Start: 2023-01-28

## 2023-02-09 RX ORDER — BUDESONIDE AND FORMOTEROL FUMARATE DIHYDRATE 160; 4.5 UG/1; UG/1
160-4.5 AEROSOL RESPIRATORY (INHALATION)
Qty: 10 | Refills: 0 | Status: ACTIVE | COMMUNITY
Start: 2023-01-15

## 2023-02-09 RX ORDER — BENZONATATE 100 MG/1
100 CAPSULE ORAL
Qty: 30 | Refills: 0 | Status: ACTIVE | COMMUNITY
Start: 2023-01-26

## 2023-02-09 RX ORDER — GABAPENTIN 100 MG/1
100 CAPSULE ORAL
Qty: 90 | Refills: 0 | Status: DISCONTINUED | COMMUNITY
Start: 2022-08-22

## 2023-02-10 NOTE — HISTORY OF PRESENT ILLNESS
[de-identified] : 41 yo F with PMHx of HTN, HLD, Pre-DM, presented for hospital follow up for eosinophilia. \par \par Pt had 2 hospitalizations in the 12/2022 for shortness of breath and cough. She was treated during first admission for asthma exacerbation with streoids and nebulizers. She was readmitted for the same complaints after. She did nor have any history of asthma prior to this. \par \par She was seen by hematology inpatient for eosinophilia. \par Pt had WBC with mostly normal values and few leukocytosis values ranging from 11-13k. She had normal Hb and platelets. Differential showed absolute eosinophil count of 1920 and 1800 x2, rest of the values were WNL.\par \par Hematologic workup done as follows:\par Normal flow cytometry; No TCR-beta and gamma, FISH for PDGFRA normal, normal karyotype. \par IgE 439 (high)\par SPEP normal, FLC normal\par Trroponin normal\par Vit B12 normal\par Jak3 neg, Ckit mutation neg, BCR/ABL neg\par \par CT CAP in the hospital was negative for any acute pathology or lymphadenopathy. \par \par She denied any fever, weight loss, rash, abdominal or chest pain, GI symtpoms, medications, recent travel. She only endorsed intermittent night sweats and dyspnea on exertion.

## 2023-02-10 NOTE — ASSESSMENT
[FreeTextEntry1] : 41 yo F presented for eosinophilia. She had 2 hospital admissions for shortness of breath, treated as asthma exacerbation. She was found to have eosinophilia on 2 counts to 1920. \par \par Hematologic workup so far has been unremarkable. \par PDGFRA was not detected. C kit mutation negative.\par BCR/ABL, JAK2 negative. Flow cytometry normal, with no TCR. \par CT CAP was normal as well. \par IgE level was high 439.\par \par Plan:\par \par -It was explained to the pt that it is very unlikely that she has underlying hematologic disorder as the cause of intermittent eosinophilia which is now normal. Eosinophilia was likely due to underlying asthma. \par -We will check Vit B12  level, serum tryptase level, PDGFRB mutation as well.\par -Repeat CBC today\par -She was referred to Allergy and immunology specialist as well. \par -Follow up with pulmonary for PFTs ans further workup, \par \par RTC 4 months.\par \par Due to COVID 19, pre-visit patient instructions were explained to the patient and their symptoms were checked upon arrival.\par \par Masks were used by the health care providers and staff and the examination room was cleaned before and after the patient visit was completed\par \par Pt seen and plan discussed with Dr Sweet. \par

## 2023-02-14 ENCOUNTER — APPOINTMENT (OUTPATIENT)
Dept: PULMONOLOGY | Facility: CLINIC | Age: 43
End: 2023-02-14
Payer: MEDICAID

## 2023-02-14 PROCEDURE — 99214 OFFICE O/P EST MOD 30 MIN: CPT

## 2023-02-14 RX ORDER — BENZONATATE 100 MG/1
100 CAPSULE ORAL
Qty: 90 | Refills: 1 | Status: ACTIVE | COMMUNITY
Start: 2023-02-14 | End: 1900-01-01

## 2023-02-14 RX ORDER — BUDESONIDE AND FORMOTEROL FUMARATE DIHYDRATE 160; 4.5 UG/1; UG/1
160-4.5 AEROSOL RESPIRATORY (INHALATION) TWICE DAILY
Qty: 1 | Refills: 3 | Status: ACTIVE | COMMUNITY
Start: 2023-02-14 | End: 1900-01-01

## 2023-02-14 RX ORDER — ALBUTEROL SULFATE 90 UG/1
108 (90 BASE) INHALANT RESPIRATORY (INHALATION)
Qty: 1 | Refills: 3 | Status: ACTIVE | COMMUNITY
Start: 2023-02-14 | End: 1900-01-01

## 2023-02-14 NOTE — PHYSICAL EXAM
[No Acute Distress] : no acute distress [Normal Oropharynx] : normal oropharynx [III] : Mallampati Class: III [Normal Appearance] : normal appearance [No Neck Mass] : no neck mass [Normal Rate/Rhythm] : normal rate/rhythm [Normal S1, S2] : normal s1, s2 [No Murmurs] : no murmurs [No Resp Distress] : no resp distress [Clear to Auscultation Bilaterally] : clear to auscultation bilaterally [No Abnormalities] : no abnormalities [Benign] : benign [Normal Gait] : normal gait [No Clubbing] : no clubbing [No Cyanosis] : no cyanosis [No Edema] : no edema [FROM] : FROM [Normal Color/ Pigmentation] : normal color/ pigmentation [No Focal Deficits] : no focal deficits [Oriented x3] : oriented x3 [Normal Affect] : normal affect [TextBox_68] : cough has improved; no wheezing on exam

## 2023-02-14 NOTE — HISTORY OF PRESENT ILLNESS
[TextBox_4] : 42-year-old female who is presenting for follow-up after admission to the hospital.  She was found to have significant peripheral eosinophilia and was admitted for asthma exacerbation.  She had a chest x-ray and CT of the chest that were negative.  Her eosinophil count was more than 1000.  She recently followed with hematology and her CBC with differential showed decrease in the number of eosinophils.  She is pending follow-up with an allergist.  As far as the asthma goes her symptoms are well controlled on Symbicort 160 twice daily.  She rarely has to use her albuterol at least for the past couple of weeks.  She is on benzonatate as needed for the cough.  She also is known to snore at night, has had witnessed apneas, and feels and tired and fatigued during the day with the need to take a nap in the early afternoon.  She will need a sleep study which we ordered.

## 2023-02-24 ENCOUNTER — OUTPATIENT (OUTPATIENT)
Dept: OUTPATIENT SERVICES | Facility: HOSPITAL | Age: 43
LOS: 1 days | Discharge: HOME | End: 2023-02-24
Payer: MEDICAID

## 2023-02-24 ENCOUNTER — APPOINTMENT (OUTPATIENT)
Dept: SLEEP CENTER | Facility: HOSPITAL | Age: 43
End: 2023-02-24
Payer: MEDICAID

## 2023-02-24 DIAGNOSIS — G47.33 OBSTRUCTIVE SLEEP APNEA (ADULT) (PEDIATRIC): ICD-10-CM

## 2023-02-24 PROCEDURE — 95800 SLP STDY UNATTENDED: CPT | Mod: 26

## 2023-02-24 PROCEDURE — 95800 SLP STDY UNATTENDED: CPT

## 2023-02-26 DIAGNOSIS — G47.33 OBSTRUCTIVE SLEEP APNEA (ADULT) (PEDIATRIC): ICD-10-CM

## 2023-03-01 ENCOUNTER — OUTPATIENT (OUTPATIENT)
Dept: OUTPATIENT SERVICES | Facility: HOSPITAL | Age: 43
LOS: 1 days | End: 2023-03-01
Payer: MEDICAID

## 2023-03-01 DIAGNOSIS — R06.02 SHORTNESS OF BREATH: ICD-10-CM

## 2023-03-01 PROCEDURE — 94727 GAS DIL/WSHOT DETER LNG VOL: CPT | Mod: 26

## 2023-03-01 PROCEDURE — 94726 PLETHYSMOGRAPHY LUNG VOLUMES: CPT

## 2023-03-01 PROCEDURE — 94070 EVALUATION OF WHEEZING: CPT

## 2023-03-01 PROCEDURE — 94729 DIFFUSING CAPACITY: CPT | Mod: 26

## 2023-03-01 PROCEDURE — 94060 EVALUATION OF WHEEZING: CPT | Mod: 26

## 2023-03-01 PROCEDURE — 94729 DIFFUSING CAPACITY: CPT

## 2023-03-02 DIAGNOSIS — R06.02 SHORTNESS OF BREATH: ICD-10-CM

## 2023-03-27 DIAGNOSIS — D72.10 EOSINOPHILIA, UNSPECIFIED: ICD-10-CM

## 2023-03-28 DIAGNOSIS — D72.10 EOSINOPHILIA, UNSPECIFIED: ICD-10-CM

## 2023-04-17 ENCOUNTER — APPOINTMENT (OUTPATIENT)
Dept: PULMONOLOGY | Facility: CLINIC | Age: 43
End: 2023-04-17
Payer: MEDICAID

## 2023-04-17 VITALS
OXYGEN SATURATION: 95 % | RESPIRATION RATE: 14 BRPM | SYSTOLIC BLOOD PRESSURE: 130 MMHG | HEIGHT: 62 IN | DIASTOLIC BLOOD PRESSURE: 80 MMHG | HEART RATE: 89 BPM | BODY MASS INDEX: 32.2 KG/M2 | WEIGHT: 175 LBS

## 2023-04-17 PROCEDURE — 99214 OFFICE O/P EST MOD 30 MIN: CPT | Mod: GC

## 2023-04-17 RX ORDER — BUDESONIDE AND FORMOTEROL FUMARATE DIHYDRATE 160; 4.5 UG/1; UG/1
160-4.5 AEROSOL RESPIRATORY (INHALATION) TWICE DAILY
Qty: 1 | Refills: 3 | Status: ACTIVE | COMMUNITY
Start: 2023-04-17 | End: 1900-01-01

## 2023-04-17 NOTE — HISTORY OF PRESENT ILLNESS
[TextBox_4] : 42-year-old female who is presenting for follow-up after admission to the hospital.  She was found to have significant peripheral eosinophilia and was admitted for asthma exacerbation.  She had a chest x-ray and CT of the chest that were negative.  Her eosinophil count was more than 1000.  She recently followed with hematology and her CBC with differential showed decrease in the number of eosinophils.  She is pending follow-up with an allergist.  As far as the asthma goes her symptoms are well controlled on Symbicort 160 twice daily.  She rarely has to use her albuterol at least for the past couple of weeks.  She is on benzonatate as needed for the cough.  She also is known to snore at night, has had witnessed apneas, and feels and tired and fatigued during the day with the need to take a nap in the early afternoon.  She will need a sleep study which we ordered.\par \par 4/17/23: On Symbicort her symptoms are well controlled.  At this point we will move Symbicort to as needed.  PFTs are reviewed and are normal.  Otherwise sleep study also showed mild sleep apnea.  She will think about CPAP but is not interested at the moment.  She has an appointment with an allergist/immunologist for June.  Follow-up in 3 months.

## 2023-05-04 ENCOUNTER — OUTPATIENT (OUTPATIENT)
Dept: OUTPATIENT SERVICES | Facility: HOSPITAL | Age: 43
LOS: 1 days | End: 2023-05-04
Payer: MEDICAID

## 2023-05-04 DIAGNOSIS — R07.9 CHEST PAIN, UNSPECIFIED: ICD-10-CM

## 2023-05-04 DIAGNOSIS — Z00.8 ENCOUNTER FOR OTHER GENERAL EXAMINATION: ICD-10-CM

## 2023-05-04 PROCEDURE — 75574 CT ANGIO HRT W/3D IMAGE: CPT

## 2023-05-04 PROCEDURE — 75574 CT ANGIO HRT W/3D IMAGE: CPT | Mod: 26

## 2023-05-05 DIAGNOSIS — R07.9 CHEST PAIN, UNSPECIFIED: ICD-10-CM

## 2023-06-15 ENCOUNTER — APPOINTMENT (OUTPATIENT)
Dept: HEMATOLOGY ONCOLOGY | Facility: CLINIC | Age: 43
End: 2023-06-15

## 2023-07-17 ENCOUNTER — APPOINTMENT (OUTPATIENT)
Dept: PULMONOLOGY | Facility: CLINIC | Age: 43
End: 2023-07-17
Payer: MEDICAID

## 2023-07-17 VITALS
BODY MASS INDEX: 34.23 KG/M2 | SYSTOLIC BLOOD PRESSURE: 120 MMHG | HEIGHT: 62 IN | DIASTOLIC BLOOD PRESSURE: 70 MMHG | WEIGHT: 186 LBS | HEART RATE: 60 BPM | OXYGEN SATURATION: 98 %

## 2023-07-17 DIAGNOSIS — J45.40 MODERATE PERSISTENT ASTHMA, UNCOMPLICATED: ICD-10-CM

## 2023-07-17 DIAGNOSIS — G47.30 SLEEP APNEA, UNSPECIFIED: ICD-10-CM

## 2023-07-17 DIAGNOSIS — D72.10 EOSINOPHILIA, UNSPECIFIED: ICD-10-CM

## 2023-07-17 PROCEDURE — 99214 OFFICE O/P EST MOD 30 MIN: CPT

## 2023-07-17 NOTE — HISTORY OF PRESENT ILLNESS
[TextBox_4] : 42-year-old female who is presenting for follow-up after admission to the hospital.  She was found to have significant peripheral eosinophilia and was admitted for asthma exacerbation.  She had a chest x-ray and CT of the chest that were negative.  Her eosinophil count was more than 1000.  She recently followed with hematology and her CBC with differential showed decrease in the number of eosinophils.  She is pending follow-up with an allergist.  As far as the asthma goes her symptoms are well controlled on Symbicort 160 twice daily.  She rarely has to use her albuterol at least for the past couple of weeks.  She is on benzonatate as needed for the cough.  She also is known to snore at night, has had witnessed apneas, and feels and tired and fatigued during the day with the need to take a nap in the early afternoon.  She will need a sleep study which we ordered.\par \par \par 7/17/2023 remains on Symbicort twice daily.  We will lowered to 80.  Previous PFTs were normal.  Lungs are clear on exam.  No recent exacerbations.  She completed a recent hike with her children and was short of breath at the end.  She has an allergy and immunology appointment in September.  Otherwise her asthma is well controlled.  We will follow-up in another 3 months.

## 2023-10-18 RX ORDER — BUDESONIDE AND FORMOTEROL FUMARATE DIHYDRATE 80; 4.5 UG/1; UG/1
80-4.5 AEROSOL RESPIRATORY (INHALATION) TWICE DAILY
Qty: 1 | Refills: 2 | Status: ACTIVE | COMMUNITY
Start: 2023-07-17 | End: 1900-01-01

## 2023-12-04 ENCOUNTER — APPOINTMENT (OUTPATIENT)
Dept: PULMONOLOGY | Facility: CLINIC | Age: 43
End: 2023-12-04

## 2024-06-16 NOTE — PHYSICAL EXAM
[No Acute Distress] : no acute distress [Normal Oropharynx] : normal oropharynx [III] : Mallampati Class: III [Normal Appearance] : normal appearance [No Neck Mass] : no neck mass [Normal Rate/Rhythm] : normal rate/rhythm [Normal S1, S2] : normal s1, s2 [No Murmurs] : no murmurs [No Resp Distress] : no resp distress [Clear to Auscultation Bilaterally] : clear to auscultation bilaterally [No Abnormalities] : no abnormalities [Benign] : benign [Normal Gait] : normal gait [No Clubbing] : no clubbing [No Cyanosis] : no cyanosis [No Edema] : no edema [FROM] : FROM [Normal Color/ Pigmentation] : normal color/ pigmentation [No Focal Deficits] : no focal deficits Rabies exposure [Oriented x3] : oriented x3 [Normal Affect] : normal affect [TextBox_68] : cough has improved; no wheezing on exam

## 2025-01-14 NOTE — DISCHARGE NOTE PROVIDER - NSDCMRMEDTOKEN_GEN_ALL_CORE_FT
Pt returned call. Advised per WAKs message. V/u and has no further questions at this time.    albuterol 90 mcg/inh inhalation aerosol: 2 puff(s) inhaled every 6 hours  amLODIPine 5 mg oral tablet: 1 tab(s) orally once a day   atorvastatin 20 mg oral tablet: 1 tab(s) orally once a day  benzonatate 100 mg oral capsule: 1 cap(s) orally every 8 hours, As needed, Cough  budesonide-formoterol 160 mcg-4.5 mcg/inh inhalation aerosol: 1 inhaler(s) inhaled 2 times a day   metFORMIN 500 mg oral tablet: 1 tab(s) orally 2 times a day  Synthroid 88 mcg (0.088 mg) oral tablet: 1 tab(s) orally once a day